# Patient Record
Sex: FEMALE | Race: BLACK OR AFRICAN AMERICAN | Employment: FULL TIME | URBAN - METROPOLITAN AREA
[De-identification: names, ages, dates, MRNs, and addresses within clinical notes are randomized per-mention and may not be internally consistent; named-entity substitution may affect disease eponyms.]

---

## 2023-02-23 LAB — SARS-COV-2 RESULT: DETECTED

## 2023-03-28 PROBLEM — M54.50 CHRONIC LOW BACK PAIN: Status: ACTIVE | Noted: 2023-03-28

## 2023-03-28 PROBLEM — T78.40XA ALLERGIES: Status: ACTIVE | Noted: 2023-03-28

## 2023-03-28 PROBLEM — G89.29 CHRONIC LOW BACK PAIN: Status: ACTIVE | Noted: 2023-03-28

## 2023-03-28 PROBLEM — M54.16 LUMBAR RADICULOPATHY, CHRONIC: Status: ACTIVE | Noted: 2023-03-28

## 2023-03-28 PROBLEM — M43.10 RETROLISTHESIS OF VERTEBRAE: Status: ACTIVE | Noted: 2023-03-28

## 2023-03-28 PROBLEM — E66.01 MORBID OBESITY (MULTI): Status: ACTIVE | Noted: 2023-03-28

## 2023-03-28 PROBLEM — M53.3 TAIL BONE PAIN: Status: ACTIVE | Noted: 2023-03-28

## 2023-03-28 PROBLEM — H93.11 TINNITUS OF RIGHT EAR: Status: ACTIVE | Noted: 2023-03-28

## 2023-03-28 RX ORDER — TRAMADOL HYDROCHLORIDE 50 MG/1
1 TABLET ORAL 3 TIMES DAILY PRN
COMMUNITY
Start: 2022-01-13 | End: 2024-06-06 | Stop reason: ALTCHOICE

## 2023-03-28 RX ORDER — DICLOFENAC SODIUM 75 MG/1
TABLET, DELAYED RELEASE ORAL
COMMUNITY
Start: 2022-07-19 | End: 2024-04-12

## 2023-05-04 PROBLEM — M62.838 MUSCLE SPASMS OF BOTH LOWER EXTREMITIES: Status: ACTIVE | Noted: 2023-05-04

## 2023-05-04 PROBLEM — M47.816 LUMBAR SPONDYLOSIS: Status: ACTIVE | Noted: 2023-05-04

## 2023-05-04 RX ORDER — CETIRIZINE HYDROCHLORIDE 10 MG/1
TABLET ORAL AS NEEDED
COMMUNITY
End: 2024-06-06 | Stop reason: SDUPTHER

## 2023-05-11 ENCOUNTER — TELEPHONE (OUTPATIENT)
Dept: PRIMARY CARE | Facility: CLINIC | Age: 48
End: 2023-05-11
Payer: COMMERCIAL

## 2023-05-12 DIAGNOSIS — Z12.31 BREAST CANCER SCREENING BY MAMMOGRAM: Primary | ICD-10-CM

## 2023-06-01 ENCOUNTER — OFFICE VISIT (OUTPATIENT)
Dept: PRIMARY CARE | Facility: CLINIC | Age: 48
End: 2023-06-01
Payer: COMMERCIAL

## 2023-06-01 VITALS
BODY MASS INDEX: 47.48 KG/M2 | SYSTOLIC BLOOD PRESSURE: 128 MMHG | DIASTOLIC BLOOD PRESSURE: 94 MMHG | WEIGHT: 258 LBS | HEART RATE: 85 BPM | TEMPERATURE: 97 F | HEIGHT: 62 IN | OXYGEN SATURATION: 96 %

## 2023-06-01 DIAGNOSIS — H61.23 IMPACTED CERUMEN OF BOTH EARS: ICD-10-CM

## 2023-06-01 DIAGNOSIS — M54.16 LUMBAR RADICULOPATHY, CHRONIC: ICD-10-CM

## 2023-06-01 DIAGNOSIS — T78.40XS ALLERGY, SEQUELA: ICD-10-CM

## 2023-06-01 DIAGNOSIS — E78.2 MIXED HYPERLIPIDEMIA: ICD-10-CM

## 2023-06-01 DIAGNOSIS — H93.11 TINNITUS OF RIGHT EAR: ICD-10-CM

## 2023-06-01 DIAGNOSIS — Z00.00 ANNUAL PHYSICAL EXAM: Primary | ICD-10-CM

## 2023-06-01 DIAGNOSIS — Z12.11 COLON CANCER SCREENING: ICD-10-CM

## 2023-06-01 DIAGNOSIS — R25.2 MUSCLE CRAMP, NOCTURNAL: ICD-10-CM

## 2023-06-01 PROBLEM — Z90.710 S/P TAH (TOTAL ABDOMINAL HYSTERECTOMY): Status: ACTIVE | Noted: 2019-08-08

## 2023-06-01 PROBLEM — N93.9 ABNORMAL UTERINE BLEEDING (AUB): Status: ACTIVE | Noted: 2019-06-04

## 2023-06-01 LAB
ERYTHROCYTE DISTRIBUTION WIDTH (RATIO) BY AUTOMATED COUNT: 12.9 % (ref 11.5–14.5)
ERYTHROCYTE MEAN CORPUSCULAR HEMOGLOBIN CONCENTRATION (G/DL) BY AUTOMATED: 31.8 G/DL (ref 32–36)
ERYTHROCYTE MEAN CORPUSCULAR VOLUME (FL) BY AUTOMATED COUNT: 98 FL (ref 80–100)
ERYTHROCYTES (10*6/UL) IN BLOOD BY AUTOMATED COUNT: 4.42 X10E12/L (ref 4–5.2)
ESTIMATED AVERAGE GLUCOSE FOR HBA1C: 105 MG/DL
HEMATOCRIT (%) IN BLOOD BY AUTOMATED COUNT: 43.1 % (ref 36–46)
HEMOGLOBIN (G/DL) IN BLOOD: 13.7 G/DL (ref 12–16)
HEMOGLOBIN A1C/HEMOGLOBIN TOTAL IN BLOOD: 5.3 %
LEUKOCYTES (10*3/UL) IN BLOOD BY AUTOMATED COUNT: 6.7 X10E9/L (ref 4.4–11.3)
NRBC (PER 100 WBCS) BY AUTOMATED COUNT: 0 /100 WBC (ref 0–0)
PLATELETS (10*3/UL) IN BLOOD AUTOMATED COUNT: 339 X10E9/L (ref 150–450)

## 2023-06-01 PROCEDURE — 80061 LIPID PANEL: CPT

## 2023-06-01 PROCEDURE — 83036 HEMOGLOBIN GLYCOSYLATED A1C: CPT

## 2023-06-01 PROCEDURE — 69210 REMOVE IMPACTED EAR WAX UNI: CPT | Performed by: NURSE PRACTITIONER

## 2023-06-01 PROCEDURE — 84443 ASSAY THYROID STIM HORMONE: CPT

## 2023-06-01 PROCEDURE — 82306 VITAMIN D 25 HYDROXY: CPT

## 2023-06-01 PROCEDURE — 80053 COMPREHEN METABOLIC PANEL: CPT

## 2023-06-01 PROCEDURE — 1036F TOBACCO NON-USER: CPT | Performed by: NURSE PRACTITIONER

## 2023-06-01 PROCEDURE — 85027 COMPLETE CBC AUTOMATED: CPT

## 2023-06-01 PROCEDURE — 99396 PREV VISIT EST AGE 40-64: CPT | Performed by: NURSE PRACTITIONER

## 2023-06-01 RX ORDER — FLUTICASONE PROPIONATE 50 MCG
1 SPRAY, SUSPENSION (ML) NASAL DAILY
Qty: 16 G | Refills: 11 | Status: SHIPPED | OUTPATIENT
Start: 2023-06-01 | End: 2024-06-06 | Stop reason: SDUPTHER

## 2023-06-01 NOTE — PROGRESS NOTES
"Subjective   Patient ID: Yajaira Obrien is a 47 y.o. female who presents for Annual Exam (Patient is fasting).    HPI  Pleasant established patient her for Annual   Current concerns include lower leg and ankle spasms, usually occur at night, relieved with massage. Spends most days on her feet at work, History of chronic back pain, Lumbar radiculopathy. Reports PT told her to increase her fluids which she has been doing.  Reviewed muscle fatigue and spasm, obtaining well fitting supportive shoes and daily stretching. Has James ap and tries to follow PT at home.    Pain Management awaiting MRI and plan pending results is to possibly proceed with injections at that time      Works as  Phlebotomist    Diet better, using air fryer, more vegetables  Caffeine no, sometimes tea  Water 64 oz+  Non-smoker  Alcohol Social    Eye exam 2020  Dental exam >5  Gyn no abnormal pap needs referral, Hx hyster  Colonoscopy ref  Mammogram May 2023, reviewed, WNL    Family history non contributory, sister SLE in remission    Review of Systems  Review of Systems   Constitutional: Negative.    HENT: Negative.     Respiratory: Negative.     Cardiovascular: Negative.    Gastrointestinal: Negative.    Genitourinary: Negative.    Musculoskeletal: HPI  Psychiatric/Behavioral: Negative.     All other systems reviewed and are negative.    .vsVisit Vitals  BP (!) 128/94 (BP Location: Left arm, Patient Position: Sitting)   Pulse 85   Temp 36.1 °C (97 °F)   Ht 1.575 m (5' 2\")   Wt 117 kg (258 lb)   SpO2 96%   BMI 47.19 kg/m²   Smoking Status Never   BSA 2.26 m²         Objective   Physical Exam  Physical Exam  Vitals reviewed.   Constitutional:       General: She is active.   HENT:      Head: Normocephalic and atraumatic.   Impacted cerumen bilateral  Eyes:      Extraocular Movements: Extraocular movements intact.      Pupils: Pupils are equal, round, and reactive to light.   Cardiovascular:      Rate and Rhythm: Normal rate and regular " rhythm.   Pulmonary:      Effort: Pulmonary effort is normal.      Breath sounds: Normal breath sounds.   Abdominal:      General: Bowel sounds are normal.   Musculoskeletal:         General: Normal range of motion.      Cervical back: Neck supple.   Skin:     General: Skin is warm and dry.      Capillary Refill: Capillary refill takes less than 2 seconds.   Neurological:      General: No focal deficit present.      Mental Status: She is alert.     Assessment/Plan   Problem List Items Addressed This Visit       Allergies    Relevant Medications    fluticasone (Flonase) 50 mcg/actuation nasal spray    Lumbar radiculopathy, chronic     Follows with Dr Diaz, completed PT   Awaiting MRI  Daily stretching         Tinnitus of right ear    Hyperlipidemia    Relevant Orders    Lipid Panel    Annual physical exam - Primary    Relevant Medications    fluticasone (Flonase) 50 mcg/actuation nasal spray    Other Relevant Orders    CBC    Comprehensive Metabolic Panel    Vitamin D, Total    TSH with reflex to Free T4 if abnormal    Hemoglobin A1C    Lipid Panel    Referral to Gastroenterology    Referral to Gynecology    Impacted cerumen of both ears    Relevant Orders    Ear cerumen removal    Colon cancer screening    Relevant Orders    Referral to Gastroenterology    Muscle cramp, nocturnal     Likely fatigue, begin daily Stretch  Supportive shoes

## 2023-06-02 DIAGNOSIS — E78.2 MIXED HYPERLIPIDEMIA: Primary | ICD-10-CM

## 2023-06-02 LAB
ALANINE AMINOTRANSFERASE (SGPT) (U/L) IN SER/PLAS: 8 U/L (ref 7–45)
ALBUMIN (G/DL) IN SER/PLAS: 3.8 G/DL (ref 3.4–5)
ALKALINE PHOSPHATASE (U/L) IN SER/PLAS: 66 U/L (ref 33–110)
ANION GAP IN SER/PLAS: 13 MMOL/L (ref 10–20)
ASPARTATE AMINOTRANSFERASE (SGOT) (U/L) IN SER/PLAS: 12 U/L (ref 9–39)
BILIRUBIN TOTAL (MG/DL) IN SER/PLAS: 0.4 MG/DL (ref 0–1.2)
CALCIDIOL (25 OH VITAMIN D3) (NG/ML) IN SER/PLAS: 20 NG/ML
CALCIUM (MG/DL) IN SER/PLAS: 9.1 MG/DL (ref 8.6–10.6)
CARBON DIOXIDE, TOTAL (MMOL/L) IN SER/PLAS: 26 MMOL/L (ref 21–32)
CHLORIDE (MMOL/L) IN SER/PLAS: 104 MMOL/L (ref 98–107)
CHOLESTEROL (MG/DL) IN SER/PLAS: 244 MG/DL (ref 0–199)
CHOLESTEROL IN HDL (MG/DL) IN SER/PLAS: 50.9 MG/DL
CHOLESTEROL/HDL RATIO: 4.8
CREATININE (MG/DL) IN SER/PLAS: 0.62 MG/DL (ref 0.5–1.05)
GFR FEMALE: >90 ML/MIN/1.73M2
GLUCOSE (MG/DL) IN SER/PLAS: 93 MG/DL (ref 74–99)
LDL: 178 MG/DL (ref 0–99)
POTASSIUM (MMOL/L) IN SER/PLAS: 4.2 MMOL/L (ref 3.5–5.3)
PROTEIN TOTAL: 6.9 G/DL (ref 6.4–8.2)
SODIUM (MMOL/L) IN SER/PLAS: 139 MMOL/L (ref 136–145)
THYROTROPIN (MIU/L) IN SER/PLAS BY DETECTION LIMIT <= 0.05 MIU/L: 1.49 MIU/L (ref 0.44–3.98)
TRIGLYCERIDE (MG/DL) IN SER/PLAS: 74 MG/DL (ref 0–149)
UREA NITROGEN (MG/DL) IN SER/PLAS: 11 MG/DL (ref 6–23)
VLDL: 15 MG/DL (ref 0–40)

## 2023-06-02 RX ORDER — ATORVASTATIN CALCIUM 10 MG/1
10 TABLET, FILM COATED ORAL DAILY
Qty: 30 TABLET | Refills: 5 | Status: SHIPPED | OUTPATIENT
Start: 2023-06-02 | End: 2023-12-19 | Stop reason: SDUPTHER

## 2023-08-04 ENCOUNTER — HOSPITAL ENCOUNTER (OUTPATIENT)
Dept: DATA CONVERSION | Facility: HOSPITAL | Age: 48
End: 2023-08-04
Attending: PHYSICAL MEDICINE & REHABILITATION | Admitting: PHYSICAL MEDICINE & REHABILITATION
Payer: COMMERCIAL

## 2023-08-04 DIAGNOSIS — M47.816 SPONDYLOSIS WITHOUT MYELOPATHY OR RADICULOPATHY, LUMBAR REGION: ICD-10-CM

## 2023-08-04 DIAGNOSIS — G89.29 OTHER CHRONIC PAIN: ICD-10-CM

## 2023-08-04 DIAGNOSIS — M48.061 SPINAL STENOSIS, LUMBAR REGION WITHOUT NEUROGENIC CLAUDICATION: ICD-10-CM

## 2023-08-04 DIAGNOSIS — M54.16 RADICULOPATHY, LUMBAR REGION: ICD-10-CM

## 2023-08-04 DIAGNOSIS — Z88.2 ALLERGY STATUS TO SULFONAMIDES: ICD-10-CM

## 2023-09-18 ENCOUNTER — LAB (OUTPATIENT)
Dept: LAB | Facility: LAB | Age: 48
End: 2023-09-18
Payer: COMMERCIAL

## 2023-09-18 LAB — TOBACCO SCREEN, URINE: NEGATIVE

## 2023-09-29 VITALS
RESPIRATION RATE: 16 BRPM | TEMPERATURE: 96.8 F | WEIGHT: 256.17 LBS | SYSTOLIC BLOOD PRESSURE: 140 MMHG | HEIGHT: 62 IN | HEART RATE: 82 BPM | BODY MASS INDEX: 47.14 KG/M2 | DIASTOLIC BLOOD PRESSURE: 90 MMHG

## 2023-09-30 NOTE — H&P
History of Present Illness:   Pregnant/Lactating:  ·  Are You Pregnant no (1)   ·  Are You Currently Breastfeeding no (1)     History Present Illness:  Reason for surgery: Chronic lower back and leg pain   HPI:    Chronic lower back and leg pain refractory to conservative treatment, achy and burning, worse with activity, better at rest, trying to avoid surgery, pain can be  a 5-7/10.    Allergies:        Allergies:  ·  Cipro : Itching, Hives/Urticaria  ·  sulfa  drugs: Itching, Hives/Urticaria    Home Medication Review:   Home Medications Reviewed: yes     Impression/Procedure:   ·  Impression and Planned Procedure: Lumbar stenosis and radiculitis  Plan for caudal COLLIN       ERAS (Enhanced Recovery After Surgery):  ·  ERAS Patient: no       Vital Signs:  Temperature C: 36 degrees C   Temperature F: 96.8 degrees F   Heart Rate: 82 beats per minute   Respiratory Rate: 16 breath per minute   Blood Pressure Systolic: 140 mm/Hg   Blood Pressure Diastolic: 90 mm/Hg     Physical Exam by System:    Constitutional: Well developed, awake/alert/oriented,  no distress, alert and cooperative   Eyes: EOMI, clear sclera   ENMT: mucous membranes moist, no apparent injury,  no lesions seen   Head/Neck: Neck supple, no apparent injury, trachea  midline   Respiratory/Thorax: Patent airways, non-labored breathing  with good chest expansion   Cardiovascular: no sig. edema   Extremities: normal extremities, no cyanosis edema,  contusions or wounds, no clubbing   Psychological: Appropriate mood and behavior   Skin: Warm and dry, no lesions, no rashes     Consent:   COVID-19 Consent:  ·  COVID-19 Risk Consent Surgeon has reviewed key risks related to the risk of tiffanie COVID-19 and if they contract COVID-19 what the risks are.       Electronic Signatures:  Chai Diaz)  (Signed 04-Aug-2023 11:37)   Authored: History of Present Illness, Allergies, Home  Medication Review, Impression/Procedure, ERAS, Physical Exam, Consent,  "Note Completion      Last Updated: 04-Aug-2023 11:37 by Chai Diaz)    References:  1.  Data Referenced From \"Patient Profile - Preop v3\" 03-Aug-2023 11:34   "

## 2023-10-01 NOTE — OP NOTE
Post Operative Note:     PreOp Diagnosis: Lumbar radiculitis and stenosis   Post-Procedure Diagnosis: same   Procedure: 1. Caudal COLLIN  2.   3.   4.   5.   Surgeon: ERI Diaz MD   Resident/Fellow/Other Assistant: NA   Estimated Blood Loss (mL): none   Specimen: no   Findings: NA     Operative Report Dictated:  Dictation: not applicable - note contains Operative  Report   Operative Report:    Fluoroscopic Caudal Epidural Steroid Injection    Indication: Lumbar/lumbosacral radiculitis and stenosis     After a discussion of the relative risks and benefits of the procedure, verbal and written informed consent was obtained.  The patient was taken to the fluoroscopy suite.    The patient was placed in the prone position on the fluoroscopic table and the midline along the intergluteal groove and area 10 cm by 5 cm laterally was prepped and draped in a sterile fashion.     DuraPrep was used per skin prep guidelines.  We waited 3 minutes by timer for the DuraPrep to dry before proceed with the procedure.    The superficial skin was anesthetized with 1% preservative free Lidocaine at the intended injection site just above the anus over the anococcygeal ligament.   Using sterile technique, a 3-1/2 inch 22G spinal needle was then directed through sacrococcygeal junction.      Negative aspiration was performed to rule out intravascular placement. Contrast was noted as a a small blob at the anterior border of the vertebral column.     After this, 20 mg of Dexamethasone + 10 cc of  1% xylocaine preservative free was injected after lack-of-resistance was established. The needle was subsequently removed.  Palpitation at the injection site during the injection failed to reveal any intradermal  extravasation of the injected fluid.     Pulse oximeter was used throughout the procedure and the patient's pulse and oxygen saturation remained within normal limits.  Vital signs remained normal.  There were no complications.     The  patient was instructed to apply ice over the injection site for twenty minutes every two hours for the next twenty-four to forty-eight hours.  The patient was also instructed to contact me if there is any exacerbation of the symptoms. Post procedure  instruction sheet was given.  The patient was recommended to follow up with me in one to two weeks.      Attestation:   Note Completion:  Attending Attestation I performed the procedure without a resident         Electronic Signatures:  Chai Diaz)  (Signed 04-Aug-2023 12:30)   Authored: Post Operative Note, Note Completion      Last Updated: 04-Aug-2023 12:30 by Chai Diaz)

## 2023-12-19 DIAGNOSIS — E78.2 MIXED HYPERLIPIDEMIA: ICD-10-CM

## 2023-12-20 RX ORDER — ATORVASTATIN CALCIUM 10 MG/1
10 TABLET, FILM COATED ORAL DAILY
Qty: 30 TABLET | Refills: 5 | Status: SHIPPED | OUTPATIENT
Start: 2023-12-20 | End: 2024-06-06 | Stop reason: SDUPTHER

## 2024-02-08 ENCOUNTER — LAB (OUTPATIENT)
Dept: LAB | Facility: LAB | Age: 49
End: 2024-02-08
Payer: COMMERCIAL

## 2024-03-04 ENCOUNTER — TELEPHONE (OUTPATIENT)
Dept: PRIMARY CARE | Facility: CLINIC | Age: 49
End: 2024-03-04
Payer: COMMERCIAL

## 2024-03-04 DIAGNOSIS — Z12.11 COLON CANCER SCREENING: Primary | ICD-10-CM

## 2024-04-12 ENCOUNTER — OFFICE VISIT (OUTPATIENT)
Dept: GASTROENTEROLOGY | Facility: CLINIC | Age: 49
End: 2024-04-12
Payer: COMMERCIAL

## 2024-04-12 VITALS
RESPIRATION RATE: 18 BRPM | DIASTOLIC BLOOD PRESSURE: 89 MMHG | BODY MASS INDEX: 48.07 KG/M2 | HEART RATE: 94 BPM | SYSTOLIC BLOOD PRESSURE: 137 MMHG | WEIGHT: 262.57 LBS | TEMPERATURE: 97.3 F

## 2024-04-12 DIAGNOSIS — Z12.11 ENCOUNTER FOR SCREENING COLONOSCOPY: Primary | ICD-10-CM

## 2024-04-12 DIAGNOSIS — Z12.11 COLON CANCER SCREENING: ICD-10-CM

## 2024-04-12 PROCEDURE — 99204 OFFICE O/P NEW MOD 45 MIN: CPT | Performed by: NURSE PRACTITIONER

## 2024-04-12 RX ORDER — POLYETHYLENE GLYCOL 3350, SODIUM CHLORIDE, SODIUM BICARBONATE, POTASSIUM CHLORIDE 420; 11.2; 5.72; 1.48 G/4L; G/4L; G/4L; G/4L
4000 POWDER, FOR SOLUTION ORAL ONCE
Qty: 4000 ML | Refills: 0 | Status: SHIPPED | OUTPATIENT
Start: 2024-04-12 | End: 2024-04-12

## 2024-04-12 RX ORDER — BISMUTH SUBSALICYLATE 262 MG
1 TABLET,CHEWABLE ORAL DAILY
COMMUNITY

## 2024-04-12 ASSESSMENT — ENCOUNTER SYMPTOMS
LIGHT-HEADEDNESS: 0
DIAPHORESIS: 0
WEAKNESS: 0
CHILLS: 0
FATIGUE: 0
HALLUCINATIONS: 0
NERVOUS/ANXIOUS: 0
HEMATURIA: 0
MYALGIAS: 0
PHOTOPHOBIA: 0
COUGH: 0
EYE PAIN: 0
BACK PAIN: 0
AGITATION: 0
DIZZINESS: 0
FREQUENCY: 0
PALPITATIONS: 0
WHEEZING: 0
HEADACHES: 0
DYSURIA: 0
ADENOPATHY: 0
SHORTNESS OF BREATH: 0
FEVER: 0
FLANK PAIN: 0
NUMBNESS: 0
JOINT SWELLING: 0
SORE THROAT: 0
ARTHRALGIAS: 0

## 2024-04-12 NOTE — PATIENT INSTRUCTIONS
Thanks for coming to the GI clinic.     You are due for a screening colonoscopy. Please call [phone] to schedule.,   Please read all of the instructions 7 days before your colonoscopy.   You will need to take ONLY clear liquids the ENTIRE day before your procedure. These include (clear fruit juices, soda, Gatorade, broth, jello and coffee/tea) Avoid red and purple drinks. No cream or milk in the coffee.   You will need to take a bowel preparation.   You will also need a .      I recommend use of a daily fiber supplement such as psyllium husk (Metamucil).     I recommend drinking 6-8 glasses of water per day to help with constipation.     Follow up 3 weeks after completion of the colonoscopy.

## 2024-04-12 NOTE — PROGRESS NOTES
Subjective   Patient ID: Yajaira Obrien is a 48 y.o. female who presents for  colonoscopy consult.     This is a 48 year old AAF with history of morbid obesity, HLD, and chronic back pain who is presenting to the GI clinic for an initial visit.     History per pt and review of EMR    Pt is here to discuss getting a screening colonoscopy done. She never had a colonoscopy.     Reports for the past 8 years she's been having constipation. She moves her bowels daily, but feels a sense of incomplete bowel evacuation. Sometimes stools are small pellets. Some relief with Mag07. She's been trying to eat more vegetables. She drinks a lot of water.     She took Metamucil in the past.     ROS positive for 4 months' worth of sharp abdominal pain located to the left of her umbilicus which improves with defecation. Sometimes the pain goes away on its own as well. The pain last 30-60 minutes.     Denies unintentional weight loss, heartburn, dysphagia, odynophagia, diarrhea, hematemesis, hematochezia, and melena.           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   HENT:  Negative for congestion, ear pain, hearing loss, sneezing and sore throat.    Eyes:  Negative for photophobia, pain and visual disturbance.   Respiratory:  Negative for cough, shortness of breath and wheezing.    Cardiovascular:  Negative for chest pain, palpitations and leg swelling.   Endocrine: Negative for cold intolerance and heat intolerance.   Genitourinary:  Negative  for dysuria, flank pain, frequency and hematuria.   Musculoskeletal:  Negative for arthralgias, back pain, gait problem, joint swelling and myalgias.   Skin:  Negative for rash.   Neurological:  Negative for dizziness, syncope, weakness, light-headedness, numbness and headaches.   Hematological:  Negative for adenopathy.   Psychiatric/Behavioral:  Negative for agitation and hallucinations. The patient is not nervous/anxious.        Allergies   Allergen Reactions    Azithromycin Unknown    Ciprofloxacin Unknown    Sulfa (Sulfonamide Antibiotics) Unknown     Current Outpatient Medications   Medication Sig Dispense Refill    cetirizine (ZyrTEC) 10 mg tablet Take by mouth if needed.      atorvastatin (Lipitor) 10 mg tablet Take 1 tablet (10 mg) by mouth once daily. 30 tablet 5    fluticasone (Flonase) 50 mcg/actuation nasal spray Administer 1 spray into each nostril once daily. Shake gently. Before first use, prime pump. After use, clean tip and replace cap. 16 g 11    multivitamin tablet Take 1 tablet by mouth once daily. Gummy multivitamin      polyethylene glycol-electrolytes 420 gram solution Take 4,000 mL by mouth 1 time for 1 dose. Use as directed by  endoscopy department for colonoscopy 4000 mL 0    traMADol (Ultram) 50 mg tablet Take 1 tablet (50 mg) by mouth 3 times a day as needed.      UNABLE TO FIND OTC probiotic       No current facility-administered medications for this visit.        Objective     /89   Pulse 94   Temp 36.3 °C (97.3 °F) (Temporal)   Resp 18   Wt 119 kg (262 lb 9.1 oz)   BMI 48.07 kg/m²     Physical Exam  Constitutional:       General: She is not in acute distress.     Appearance: Normal appearance. She is morbidly obese.   HENT:      Head: Normocephalic and atraumatic.   Eyes:      Conjunctiva/sclera: Conjunctivae normal.   Cardiovascular:      Rate and Rhythm: Normal rate and regular rhythm.      Heart sounds: No murmur heard.     No gallop.   Pulmonary:      Effort:  Pulmonary effort is normal.      Breath sounds: Normal breath sounds.   Abdominal:      General: Bowel sounds are normal. There is no distension.      Tenderness: There is no abdominal tenderness. There is no guarding.   Musculoskeletal:         General: No swelling or deformity. Normal range of motion.      Cervical back: Normal range of motion. No rigidity.   Skin:     General: Skin is warm and dry.      Coloration: Skin is not jaundiced.      Findings: No lesion or rash.   Neurological:      General: No focal deficit present.      Mental Status: She is oriented to person, place, and time.   Psychiatric:         Mood and Affect: Mood normal.         Assessment/Plan   Problem List Items Addressed This Visit       Colon cancer screening     Other Visit Diagnoses       Encounter for screening colonoscopy    -  Primary    Relevant Medications    polyethylene glycol-electrolytes 420 gram solution    Other Relevant Orders    Colonoscopy Screening; Average Risk Patient           Colorectal cancer screening:  - will proceed with screening colonoscopy   - Golytely split bowel prep    2. Chronic constipation:  - recommend a daily fiber supplement such as psyllium husk  - recommend drinking 6-8 glasses of water per day    3. Chronic periumbilical abdominal pain: sounds functional, perhaps some IBS component  - constipation management as above    4. Follow up:  - return to clinic 3 weeks after the completion of colonoscopy

## 2024-06-06 ENCOUNTER — OFFICE VISIT (OUTPATIENT)
Dept: PRIMARY CARE | Facility: CLINIC | Age: 49
End: 2024-06-06
Payer: COMMERCIAL

## 2024-06-06 VITALS
WEIGHT: 264.2 LBS | SYSTOLIC BLOOD PRESSURE: 124 MMHG | TEMPERATURE: 96.6 F | OXYGEN SATURATION: 97 % | HEIGHT: 62 IN | DIASTOLIC BLOOD PRESSURE: 80 MMHG | BODY MASS INDEX: 48.62 KG/M2 | HEART RATE: 81 BPM

## 2024-06-06 DIAGNOSIS — Z00.00 ANNUAL PHYSICAL EXAM: Primary | ICD-10-CM

## 2024-06-06 DIAGNOSIS — T78.40XS ALLERGY, SEQUELA: ICD-10-CM

## 2024-06-06 DIAGNOSIS — M62.838 MUSCLE SPASMS OF BOTH LOWER EXTREMITIES: ICD-10-CM

## 2024-06-06 DIAGNOSIS — M54.41 CHRONIC RIGHT-SIDED LOW BACK PAIN WITH RIGHT-SIDED SCIATICA: ICD-10-CM

## 2024-06-06 DIAGNOSIS — R30.0 DYSURIA: ICD-10-CM

## 2024-06-06 DIAGNOSIS — J45.20 MILD INTERMITTENT ASTHMA WITHOUT COMPLICATION (HHS-HCC): ICD-10-CM

## 2024-06-06 DIAGNOSIS — G89.29 CHRONIC RIGHT-SIDED LOW BACK PAIN WITH RIGHT-SIDED SCIATICA: ICD-10-CM

## 2024-06-06 DIAGNOSIS — M54.16 LUMBAR RADICULOPATHY, CHRONIC: ICD-10-CM

## 2024-06-06 DIAGNOSIS — E78.2 MIXED HYPERLIPIDEMIA: ICD-10-CM

## 2024-06-06 DIAGNOSIS — E66.01 MORBID OBESITY (MULTI): ICD-10-CM

## 2024-06-06 DIAGNOSIS — Z12.31 BREAST CANCER SCREENING BY MAMMOGRAM: ICD-10-CM

## 2024-06-06 LAB
25(OH)D3 SERPL-MCNC: 51 NG/ML (ref 30–100)
ALBUMIN SERPL BCP-MCNC: 3.9 G/DL (ref 3.4–5)
ALP SERPL-CCNC: 90 U/L (ref 33–110)
ALT SERPL W P-5'-P-CCNC: 10 U/L (ref 7–45)
ANION GAP SERPL CALC-SCNC: 12 MMOL/L (ref 10–20)
AST SERPL W P-5'-P-CCNC: 13 U/L (ref 9–39)
BILIRUB SERPL-MCNC: 0.3 MG/DL (ref 0–1.2)
BUN SERPL-MCNC: 12 MG/DL (ref 6–23)
CALCIUM SERPL-MCNC: 8.9 MG/DL (ref 8.6–10.6)
CHLORIDE SERPL-SCNC: 104 MMOL/L (ref 98–107)
CHOLEST SERPL-MCNC: 179 MG/DL (ref 0–199)
CHOLESTEROL/HDL RATIO: 4
CO2 SERPL-SCNC: 28 MMOL/L (ref 21–32)
CREAT SERPL-MCNC: 0.67 MG/DL (ref 0.5–1.05)
EGFRCR SERPLBLD CKD-EPI 2021: >90 ML/MIN/1.73M*2
ERYTHROCYTE [DISTWIDTH] IN BLOOD BY AUTOMATED COUNT: 12.4 % (ref 11.5–14.5)
EST. AVERAGE GLUCOSE BLD GHB EST-MCNC: 111 MG/DL
GLUCOSE SERPL-MCNC: 90 MG/DL (ref 74–99)
HBA1C MFR BLD: 5.5 %
HCT VFR BLD AUTO: 38.7 % (ref 36–46)
HDLC SERPL-MCNC: 44.6 MG/DL
HGB BLD-MCNC: 12.9 G/DL (ref 12–16)
LDLC SERPL CALC-MCNC: 117 MG/DL
MCH RBC QN AUTO: 31.2 PG (ref 26–34)
MCHC RBC AUTO-ENTMCNC: 33.3 G/DL (ref 32–36)
MCV RBC AUTO: 94 FL (ref 80–100)
NON HDL CHOLESTEROL: 134 MG/DL (ref 0–149)
NRBC BLD-RTO: 0 /100 WBCS (ref 0–0)
PLATELET # BLD AUTO: 313 X10*3/UL (ref 150–450)
POC APPEARANCE, URINE: CLEAR
POC BILIRUBIN, URINE: NEGATIVE
POC BLOOD, URINE: NEGATIVE
POC COLOR, URINE: YELLOW
POC GLUCOSE, URINE: NEGATIVE MG/DL
POC KETONES, URINE: NEGATIVE MG/DL
POC LEUKOCYTES, URINE: NEGATIVE
POC NITRITE,URINE: NEGATIVE
POC PH, URINE: 7 PH
POC PROTEIN, URINE: NEGATIVE MG/DL
POC SPECIFIC GRAVITY, URINE: 1.02
POC UROBILINOGEN, URINE: 1 EU/DL
POTASSIUM SERPL-SCNC: 4.2 MMOL/L (ref 3.5–5.3)
PROT SERPL-MCNC: 6.7 G/DL (ref 6.4–8.2)
RBC # BLD AUTO: 4.13 X10*6/UL (ref 4–5.2)
SODIUM SERPL-SCNC: 140 MMOL/L (ref 136–145)
TRIGL SERPL-MCNC: 88 MG/DL (ref 0–149)
TSH SERPL-ACNC: 1.13 MIU/L (ref 0.44–3.98)
VLDL: 18 MG/DL (ref 0–40)
WBC # BLD AUTO: 7.4 X10*3/UL (ref 4.4–11.3)

## 2024-06-06 PROCEDURE — 85027 COMPLETE CBC AUTOMATED: CPT

## 2024-06-06 PROCEDURE — 82306 VITAMIN D 25 HYDROXY: CPT

## 2024-06-06 PROCEDURE — 80061 LIPID PANEL: CPT

## 2024-06-06 PROCEDURE — 84443 ASSAY THYROID STIM HORMONE: CPT

## 2024-06-06 PROCEDURE — 36415 COLL VENOUS BLD VENIPUNCTURE: CPT

## 2024-06-06 PROCEDURE — 1036F TOBACCO NON-USER: CPT | Performed by: NURSE PRACTITIONER

## 2024-06-06 PROCEDURE — 81002 URINALYSIS NONAUTO W/O SCOPE: CPT | Performed by: NURSE PRACTITIONER

## 2024-06-06 PROCEDURE — 80053 COMPREHEN METABOLIC PANEL: CPT

## 2024-06-06 PROCEDURE — 83036 HEMOGLOBIN GLYCOSYLATED A1C: CPT

## 2024-06-06 PROCEDURE — 99396 PREV VISIT EST AGE 40-64: CPT | Performed by: NURSE PRACTITIONER

## 2024-06-06 RX ORDER — ATORVASTATIN CALCIUM 10 MG/1
10 TABLET, FILM COATED ORAL DAILY
Qty: 90 EACH | Refills: 1 | Status: SHIPPED | OUTPATIENT
Start: 2024-06-06 | End: 2024-12-03

## 2024-06-06 RX ORDER — FLUTICASONE PROPIONATE 50 MCG
1 SPRAY, SUSPENSION (ML) NASAL DAILY
Qty: 16 G | Refills: 11 | Status: SHIPPED | OUTPATIENT
Start: 2024-06-06 | End: 2025-06-06

## 2024-06-06 RX ORDER — CETIRIZINE HYDROCHLORIDE 10 MG/1
10 TABLET ORAL AS NEEDED
Qty: 90 TABLET | Refills: 3 | Status: SHIPPED | OUTPATIENT
Start: 2024-06-06 | End: 2025-06-06

## 2024-06-06 ASSESSMENT — PROMIS GLOBAL HEALTH SCALE
CARRYOUT_PHYSICAL_ACTIVITIES: MODERATELY
RATE_PHYSICAL_HEALTH: FAIR
EMOTIONAL_PROBLEMS: SOMETIMES
RATE_AVERAGE_PAIN: 7
RATE_AVERAGE_FATIGUE: MODERATE
RATE_QUALITY_OF_LIFE: GOOD
RATE_SOCIAL_SATISFACTION: GOOD
RATE_MENTAL_HEALTH: FAIR
CARRYOUT_SOCIAL_ACTIVITIES: FAIR
RATE_GENERAL_HEALTH: GOOD

## 2024-06-06 ASSESSMENT — PAIN SCALES - GENERAL: PAINLEVEL: 0-NO PAIN

## 2024-06-06 ASSESSMENT — PATIENT HEALTH QUESTIONNAIRE - PHQ9
1. LITTLE INTEREST OR PLEASURE IN DOING THINGS: NOT AT ALL
2. FEELING DOWN, DEPRESSED OR HOPELESS: NOT AT ALL
SUM OF ALL RESPONSES TO PHQ9 QUESTIONS 1 AND 2: 0

## 2024-06-06 ASSESSMENT — ENCOUNTER SYMPTOMS: RESPIRATORY NEGATIVE: 1

## 2024-06-06 NOTE — PATIENT INSTRUCTIONS
Labs will be released to My Chart or if you are not connected you will be notified of abnormals only    Start stretching after work    Eliminate sugar form your diet    Health Maintenance  Continue to follow a healthy diet with fruits and vegetables at most meals.  Daily exercise is recommended as well as adding weights 3 times a week to maintain muscle mass and for bone health.  It is recommended you drink 6 to 8 glasses of water daily, more when you are exerting yourself or in hot weather.  Make sure you follow the health screening guidelines and keep up to date on your immunizations!    Constipation  Though occasional constipation is very common, some people experience chronic constipation that can interfere with their ability to go about their daily tasks.   Chronic constipation may also cause people to strain excessively in order to have a bowel movement.    Symptoms    Signs and symptoms of chronic constipation include:  Passing fewer than three stools a week   Having lumpy or hard stools   Straining to have bowel movements   Feeling as though there's a blockage in your rectum that prevents bowel movements   Feeling as though you can't completely empty the stool from your rectum   Needing help to empty your rectum, such as using your hands to press on your abdomen and using a finger to remove stool from your rectum  Constipation may be considered chronic if you've experienced two or more of these symptoms for the last three months.    Risk factors    Being an older adult   Being a woman   Being dehydrated   Eating a diet that's low in fiber   Getting little or no physical activity   Taking certain medications, including sedatives, opioid pain medications, some antidepressants or medications to lower blood pressure   Having a mental health condition such as depression or an eating disorder    Prevention    Include plenty of high-fiber foods in your diet, including beans, vegetables, fruits, whole grain cereals  and bran.   Eat fewer foods with low amounts of fiber such as processed foods, and dairy and meat products.   Drink plenty of fluids.   Stay as active as possible and try to get regular exercise.   Try to manage stress.   Don't ignore the urge to pass stool.   Try to create a regular schedule for bowel movements, especially after a meal.   Make sure children who begin to eat solid foods get plenty of fiber in their diets.

## 2024-06-06 NOTE — ASSESSMENT & PLAN NOTE
Follows with Chai Diaz MD.   EMG/nerve conduction study.  Continue to stretch morning and night.

## 2024-06-06 NOTE — PROGRESS NOTES
Subjective   Patient ID: Yajaira Obrien is a 48 y.o. female who presents for Annual Exam (Pt is fasting ), Weight Loss (Discuss weight loss medication ), Back Pain, and Leg Cramps.    HPI   Pleasant established 49 y/o female with a PMH of HLD, tinnitus of right ear, morbid obesity, chronic back pain, Lumbar spondylosis, Lumbar stenosis with neurogenic claudication and lumbar radiculopathy who presents for Annual Exam.    Current Concerns:  Chronic Back Pain, Lumbar radiculopathy  - Bilateral lower back and gluteal pain, Intermittent radicular symptoms down both legs. Denies any loss of bladder or bowel function. Can be intermittently severe and impact her daily activities, has noticed more frequent lower leg muscle spasms on days she works. Recently dropped down to part time at work to help manage symptoms. Yoga daily, Follows with pain management, Dr Diaz. Received Caudal epidural steroid injection in the past, denied any significant relief. Recently was recommended EMG/nerve conduction study which we discussed today.     Morbid Obesity - Interested in weight loss medication. Has been evaluated by Nutritionist. Diet needs improvement, Tries not to eat at work, discussed 3-5 small meals, no skipping. She is not exercising formally 2/2 chronic pain, does do yoga daily at home. Will investigate insurance coverage.     Constipation, chronic - Managed well with mushroom coffee and mag supplement.     Tingling with Urination - Noted recently, denies dysuria, new flank pain, frequency or hesitancy, no hematuria, taking otc supplement with good results, drinking more water. Obtain UA.       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 Caffeine no  Water 64 oz+  Exercise, yoga 5 days/week, stretches daily  Non-smoker  Alcohol Social      Eye exam 2024  Dental exam >5  Gyn hx hyster  Colonoscopy scheduled in Aug 24'  Mammogram May 2023, referral     Family  "history non contributory, sister SLE in remission    Review of Systems   HENT: Negative.     Respiratory: Negative.     Cardiovascular:         R/t anxiety   Genitourinary:         UA ordered   All other systems reviewed and are negative.        Objective   /80 (BP Location: Left arm, Patient Position: Sitting)   Pulse 81   Temp 35.9 °C (96.6 °F)   Ht 1.575 m (5' 2\")   Wt 120 kg (264 lb 3.2 oz)   SpO2 97%   BMI 48.32 kg/m²     Physical Exam  Vitals reviewed.   Constitutional:       Appearance: Normal appearance. She is well-developed. She is obese.   HENT:      Head: Normocephalic and atraumatic.      Right Ear: Tympanic membrane and ear canal normal.      Left Ear: Tympanic membrane and ear canal normal.      Nose: Nose normal.      Mouth/Throat:      Mouth: Mucous membranes are moist.      Pharynx: Oropharynx is clear.   Eyes:      Extraocular Movements: Extraocular movements intact.      Pupils: Pupils are equal, round, and reactive to light.   Neck:      Thyroid: No thyroid mass.   Cardiovascular:      Rate and Rhythm: Regular rhythm.      Pulses: Normal pulses.      Heart sounds: Normal heart sounds.   Pulmonary:      Effort: Pulmonary effort is normal.      Breath sounds: Decreased breath sounds present.      Comments: All lobes.  Abdominal:      General: Bowel sounds are normal.      Palpations: Abdomen is soft.      Tenderness: There is no abdominal tenderness.   Musculoskeletal:         General: Normal range of motion.      Cervical back: Normal range of motion and neck supple.   Lymphadenopathy:      Cervical: No cervical adenopathy.   Skin:     General: Skin is warm.      Capillary Refill: Capillary refill takes 2 to 3 seconds.   Neurological:      General: No focal deficit present.      Mental Status: She is alert.   Psychiatric:         Mood and Affect: Mood normal.         Assessment/Plan   Problem List Items Addressed This Visit             ICD-10-CM    Allergies T78.40XA    Relevant " Medications    fluticasone (Flonase) 50 mcg/actuation nasal spray    cetirizine (ZyrTEC) 10 mg tablet    Chronic low back pain M54.50, G89.29     Follows with Chai Diaz MD.   EMG/nerve conduction study.  Continue to stretch morning and night.           Lumbar radiculopathy, chronic M54.16    Relevant Orders    Vitamin D 25-Hydroxy,Total (for eval of Vitamin D levels)    Morbid obesity (Multi) E66.01     Encouraged healthy lifestyle.         Relevant Orders    CBC    Comprehensive Metabolic Panel    Hemoglobin A1C    Lipid Panel    Muscle spasms of both lower extremities M62.838     Continue yoga, stretch after work  Follows with pain management         Hyperlipidemia E78.5    Relevant Medications    atorvastatin (Lipitor) 10 mg tablet    Other Relevant Orders    Lipid Panel    Mild intermittent asthma (HHS-HCC) J45.20     Stable.         Annual physical exam - Primary Z00.00    Relevant Medications    fluticasone (Flonase) 50 mcg/actuation nasal spray    Other Relevant Orders    CBC    Comprehensive Metabolic Panel    Vitamin D 25-Hydroxy,Total (for eval of Vitamin D levels)    TSH with reflex to Free T4 if abnormal    Hemoglobin A1C    Lipid Panel    POCT UA (nonautomated) manually resulted (Completed)     Other Visit Diagnoses         Codes    Breast cancer screening by mammogram     Z12.31    Relevant Orders    BI mammo bilateral screening tomosynthesis    Dysuria     R30.0    Relevant Orders    POCT UA (nonautomated) manually resulted (Completed)

## 2024-06-06 NOTE — PROGRESS NOTES
I was present with the APRN student who participated in the documentation of this note. I have personally seen and re-examined the patient and performed the medical decision-making components (assessment and plan of care). I have reviewed the APRN student documentation and verified the findings in the note as written with additions or exceptions as stated in the body of this note  Fabienne Rosa [facility]

## 2024-06-17 ENCOUNTER — HOSPITAL ENCOUNTER (OUTPATIENT)
Dept: RADIOLOGY | Facility: CLINIC | Age: 49
Discharge: HOME | End: 2024-06-17
Payer: COMMERCIAL

## 2024-06-17 VITALS — HEIGHT: 62 IN | WEIGHT: 264 LBS | BODY MASS INDEX: 48.58 KG/M2

## 2024-06-17 PROCEDURE — 77063 BREAST TOMOSYNTHESIS BI: CPT | Performed by: STUDENT IN AN ORGANIZED HEALTH CARE EDUCATION/TRAINING PROGRAM

## 2024-06-17 PROCEDURE — 77067 SCR MAMMO BI INCL CAD: CPT

## 2024-06-17 PROCEDURE — 77067 SCR MAMMO BI INCL CAD: CPT | Performed by: STUDENT IN AN ORGANIZED HEALTH CARE EDUCATION/TRAINING PROGRAM

## 2024-07-15 DIAGNOSIS — E78.2 MIXED HYPERLIPIDEMIA: ICD-10-CM

## 2024-07-16 RX ORDER — ATORVASTATIN CALCIUM 10 MG/1
10 TABLET, FILM COATED ORAL DAILY
Qty: 90 TABLET | Refills: 2 | Status: SHIPPED | OUTPATIENT
Start: 2024-07-16 | End: 2025-01-12

## 2024-08-27 ENCOUNTER — ANESTHESIA EVENT (OUTPATIENT)
Dept: GASTROENTEROLOGY | Facility: HOSPITAL | Age: 49
End: 2024-08-27
Payer: COMMERCIAL

## 2024-08-27 ENCOUNTER — HOSPITAL ENCOUNTER (OUTPATIENT)
Dept: GASTROENTEROLOGY | Facility: HOSPITAL | Age: 49
Setting detail: OUTPATIENT SURGERY
Discharge: HOME | End: 2024-08-27
Payer: COMMERCIAL

## 2024-08-27 ENCOUNTER — ANESTHESIA (OUTPATIENT)
Dept: GASTROENTEROLOGY | Facility: HOSPITAL | Age: 49
End: 2024-08-27
Payer: COMMERCIAL

## 2024-08-27 VITALS
RESPIRATION RATE: 16 BRPM | DIASTOLIC BLOOD PRESSURE: 82 MMHG | SYSTOLIC BLOOD PRESSURE: 139 MMHG | BODY MASS INDEX: 47.67 KG/M2 | HEIGHT: 62 IN | TEMPERATURE: 97.2 F | HEART RATE: 85 BPM | WEIGHT: 259.04 LBS | OXYGEN SATURATION: 100 %

## 2024-08-27 DIAGNOSIS — Z12.11 ENCOUNTER FOR SCREENING COLONOSCOPY: ICD-10-CM

## 2024-08-27 PROBLEM — G47.33 OSA (OBSTRUCTIVE SLEEP APNEA): Status: ACTIVE | Noted: 2024-08-27

## 2024-08-27 PROCEDURE — 3700000002 HC GENERAL ANESTHESIA TIME - EACH INCREMENTAL 1 MINUTE

## 2024-08-27 PROCEDURE — 7100000010 HC PHASE TWO TIME - EACH INCREMENTAL 1 MINUTE

## 2024-08-27 PROCEDURE — 3700000001 HC GENERAL ANESTHESIA TIME - INITIAL BASE CHARGE

## 2024-08-27 PROCEDURE — 7100000009 HC PHASE TWO TIME - INITIAL BASE CHARGE

## 2024-08-27 PROCEDURE — 2500000004 HC RX 250 GENERAL PHARMACY W/ HCPCS (ALT 636 FOR OP/ED): Performed by: NURSE ANESTHETIST, CERTIFIED REGISTERED

## 2024-08-27 PROCEDURE — 45385 COLONOSCOPY W/LESION REMOVAL: CPT | Performed by: STUDENT IN AN ORGANIZED HEALTH CARE EDUCATION/TRAINING PROGRAM

## 2024-08-27 PROCEDURE — 2500000004 HC RX 250 GENERAL PHARMACY W/ HCPCS (ALT 636 FOR OP/ED): Performed by: STUDENT IN AN ORGANIZED HEALTH CARE EDUCATION/TRAINING PROGRAM

## 2024-08-27 RX ORDER — SODIUM CHLORIDE, SODIUM LACTATE, POTASSIUM CHLORIDE, CALCIUM CHLORIDE 600; 310; 30; 20 MG/100ML; MG/100ML; MG/100ML; MG/100ML
20 INJECTION, SOLUTION INTRAVENOUS CONTINUOUS
Status: DISCONTINUED | OUTPATIENT
Start: 2024-08-27 | End: 2024-08-28 | Stop reason: HOSPADM

## 2024-08-27 RX ORDER — PROPOFOL 10 MG/ML
INJECTION, EMULSION INTRAVENOUS CONTINUOUS PRN
Status: DISCONTINUED | OUTPATIENT
Start: 2024-08-27 | End: 2024-08-27

## 2024-08-27 ASSESSMENT — ENCOUNTER SYMPTOMS
WHEEZING: 0
JOINT SWELLING: 0
UNEXPECTED WEIGHT CHANGE: 0
ABDOMINAL DISTENTION: 0
DIFFICULTY URINATING: 0
COLOR CHANGE: 0
VOMITING: 0
CHILLS: 0
TROUBLE SWALLOWING: 0
NAUSEA: 0
SLEEP DISTURBANCE: 0
DIZZINESS: 0
ABDOMINAL PAIN: 0
ARTHRALGIAS: 0
SHORTNESS OF BREATH: 0
HEADACHES: 0
CONFUSION: 0
FEVER: 0
DIARRHEA: 0
SPEECH DIFFICULTY: 0
COUGH: 0
CONSTIPATION: 0
LIGHT-HEADEDNESS: 0

## 2024-08-27 ASSESSMENT — PAIN SCALES - GENERAL
PAINLEVEL_OUTOF10: 0 - NO PAIN
PAIN_LEVEL: 0
PAINLEVEL_OUTOF10: 0 - NO PAIN

## 2024-08-27 ASSESSMENT — PAIN - FUNCTIONAL ASSESSMENT
PAIN_FUNCTIONAL_ASSESSMENT: 0-10

## 2024-08-27 ASSESSMENT — COLUMBIA-SUICIDE SEVERITY RATING SCALE - C-SSRS
6. HAVE YOU EVER DONE ANYTHING, STARTED TO DO ANYTHING, OR PREPARED TO DO ANYTHING TO END YOUR LIFE?: NO
1. IN THE PAST MONTH, HAVE YOU WISHED YOU WERE DEAD OR WISHED YOU COULD GO TO SLEEP AND NOT WAKE UP?: NO
2. HAVE YOU ACTUALLY HAD ANY THOUGHTS OF KILLING YOURSELF?: NO

## 2024-08-27 NOTE — H&P
History Of Present Illness  Yajaira Obrien is a 48 y.o. female presenting with screening colonoscopy.      Past Medical History  Past Medical History:   Diagnosis Date    Allergies     Hyperlipidemia      Surgical History  Past Surgical History:   Procedure Laterality Date    HYSTERECTOMY       Social History  She reports that she has never smoked. She has never used smokeless tobacco. She reports current alcohol use. She reports that she does not currently use drugs.    Family History  No family history on file.     Allergies  Allergies   Allergen Reactions    Azithromycin Unknown    Ciprofloxacin Unknown    Sulfa (Sulfonamide Antibiotics) Unknown     Review of Systems   Constitutional:  Negative for chills, fever and unexpected weight change.   HENT:  Negative for congestion and trouble swallowing.    Respiratory:  Negative for cough, shortness of breath and wheezing.    Cardiovascular:  Negative for chest pain.   Gastrointestinal:  Negative for abdominal distention, abdominal pain, constipation, diarrhea, nausea and vomiting.   Genitourinary:  Negative for difficulty urinating.   Musculoskeletal:  Negative for arthralgias and joint swelling.   Skin:  Negative for color change.   Neurological:  Negative for dizziness, speech difficulty, light-headedness and headaches.   Psychiatric/Behavioral:  Negative for confusion and sleep disturbance.         Physical Exam  Constitutional:       General: She is awake.      Appearance: Normal appearance.   HENT:      Head: Normocephalic and atraumatic.      Nose: Nose normal.      Mouth/Throat:      Mouth: Mucous membranes are moist.   Eyes:      Pupils: Pupils are equal, round, and reactive to light.   Neck:      Thyroid: No thyroid mass.      Trachea: Phonation normal.   Cardiovascular:      Rate and Rhythm: Normal rate and regular rhythm.      Heart sounds: Normal heart sounds. No murmur heard.     No gallop.   Pulmonary:      Effort: Pulmonary effort is normal. No  "respiratory distress.      Breath sounds: Normal air entry. No decreased breath sounds, wheezing, rhonchi or rales.   Abdominal:      General: Bowel sounds are normal. There is no distension.      Palpations: Abdomen is soft.      Tenderness: There is no abdominal tenderness.   Musculoskeletal:      Cervical back: Neck supple.      Right lower leg: No edema.      Left lower leg: No edema.   Skin:     General: Skin is warm.      Capillary Refill: Capillary refill takes less than 2 seconds.   Neurological:      General: No focal deficit present.      Mental Status: She is alert and oriented to person, place, and time. Mental status is at baseline.      Cranial Nerves: Cranial nerves 2-12 are intact.      Motor: Motor function is intact.   Psychiatric:         Attention and Perception: Attention and perception normal.         Mood and Affect: Mood normal.         Speech: Speech normal.         Behavior: Behavior normal.          Last Recorded Vitals  Blood pressure 150/90, pulse 94, temperature 36.3 °C (97.3 °F), temperature source Temporal, resp. rate 18, height 1.575 m (5' 2\"), weight 118 kg (259 lb 0.7 oz), SpO2 98%.    Assessment/Plan   Screening colonoscopy     Julianna England MD  "

## 2024-08-27 NOTE — ANESTHESIA POSTPROCEDURE EVALUATION
Patient: Yajaira Obrien    Procedure Summary       Date: 08/27/24 [facility]    Anesthesia Start: 1309 Anesthesia Stop: 1404    Procedure: COLONOSCOPY Diagnosis: Encounter for screening colonoscopy    Scheduled Providers: Julianna England MD; Jefe Sue MD; KUN Sky-CRNA Responsible Provider: Jefe Sue MD    Anesthesia Type: MAC ASA Status: 3            Anesthesia Type: MAC    Vitals Value Taken Time   BP 99/66 08/27/24 1352   Temp 36.2 °C (97.2 °F) 08/27/24 1352   Pulse 95 08/27/24 1407   Resp 17 08/27/24 1407   SpO2 99 % 08/27/24 1407       Anesthesia Post Evaluation    Patient location during evaluation: bedside  Patient participation: complete - patient cannot participate  Level of consciousness: awake  Pain score: 0  Pain management: adequate  Airway patency: patent  Cardiovascular status: acceptable  Respiratory status: acceptable  Hydration status: acceptable  Postoperative Nausea and Vomiting: none        No notable events documented.

## 2024-08-27 NOTE — PERIOPERATIVE NURSING NOTE
1352: Phase 2 care begins  1355: Dr. England bedside speaking to pt  1410: Discharge instructions reviewed with pt and   1431: IV removed  1439: Pt transported to [facility]

## 2024-08-27 NOTE — ANESTHESIA PREPROCEDURE EVALUATION
Patient: Yajaira Obrien    Procedure Information       Date/Time: 08/27/24 1300    Scheduled providers: Julianna England MD; Jefe Sue MD; KACI Sky    Procedure: COLONOSCOPY    Location: [facility]            Relevant Problems   Anesthesia (within normal limits)      Cardiac   (+) Hyperlipidemia      Pulmonary  Env allergies   (+) Mild intermittent asthma (HHS-HCC)   (+) FRANCISCO (obstructive sleep apnea) (Noncompliant w CPAP)      Neuro   (+) Lumbar radiculopathy, chronic (R > L LE)      GI  constipation      /Renal (within normal limits)      Liver (within normal limits)      Endocrine   (+) Morbid obesity (Multi)      Musculoskeletal   (+) Chronic low back pain   (+) Lumbar spondylosis      ID   (+) Herpes genitalia      GYN   (+) Abnormal uterine bleeding (AUB)       Clinical information reviewed:   Tobacco  Allergies  Meds   Med Hx  Surg Hx  OB Status  Fam Hx  Soc   Hx        NPO Detail:  NPO/Void Status  Carbohydrate Drink Given Prior to Surgery? : N  Date of Last Liquid: 08/27/24  Time of Last Liquid: 0930  Date of Last Solid: 08/25/24  Last Intake Type: Clear fluids  Time of Last Void: 1203         Physical Exam    Airway  Mallampati: II     Cardiovascular    Dental    Pulmonary    Abdominal            Anesthesia Plan    History of general anesthesia?: yes  History of complications of general anesthesia?: no    ASA 3     MAC     intravenous induction   Anesthetic plan and risks discussed with patient.

## 2024-09-04 LAB
LABORATORY COMMENT REPORT: NORMAL
PATH REPORT.FINAL DX SPEC: NORMAL
PATH REPORT.GROSS SPEC: NORMAL
PATH REPORT.RELEVANT HX SPEC: NORMAL
PATH REPORT.TOTAL CANCER: NORMAL

## 2024-10-01 ENCOUNTER — HOSPITAL ENCOUNTER (OUTPATIENT)
Dept: RADIOLOGY | Facility: CLINIC | Age: 49
Discharge: HOME | End: 2024-10-01
Payer: COMMERCIAL

## 2024-10-01 ENCOUNTER — OFFICE VISIT (OUTPATIENT)
Dept: ORTHOPEDIC SURGERY | Facility: CLINIC | Age: 49
End: 2024-10-01
Payer: COMMERCIAL

## 2024-10-01 VITALS — BODY MASS INDEX: 47.84 KG/M2 | WEIGHT: 260 LBS | HEIGHT: 62 IN

## 2024-10-01 DIAGNOSIS — M54.50 LUMBAR PAIN: ICD-10-CM

## 2024-10-01 DIAGNOSIS — S39.012A STRAIN OF LUMBAR REGION, INITIAL ENCOUNTER: Primary | ICD-10-CM

## 2024-10-01 PROCEDURE — 1036F TOBACCO NON-USER: CPT | Performed by: FAMILY MEDICINE

## 2024-10-01 PROCEDURE — 72100 X-RAY EXAM L-S SPINE 2/3 VWS: CPT | Performed by: RADIOLOGY

## 2024-10-01 PROCEDURE — 3008F BODY MASS INDEX DOCD: CPT | Performed by: FAMILY MEDICINE

## 2024-10-01 PROCEDURE — 99204 OFFICE O/P NEW MOD 45 MIN: CPT | Performed by: FAMILY MEDICINE

## 2024-10-01 PROCEDURE — 72100 X-RAY EXAM L-S SPINE 2/3 VWS: CPT

## 2024-10-01 NOTE — PROGRESS NOTES
History of Present Illness   Chief Complaint   Patient presents with    Lower Back - Pain     Pt had a fall on 9/13/24  +sciatica LT>RT         The patient is 48 y.o. female  here with a complaint of low back pain.  Patient has history of some chronic low back pain, she has been previously following with Dr. Diaz, seen by him in August 2023, she has had x-rays, MRI that showed some lumbar spondylosis with some canal and foraminal narrowing prominent at lower lumbar spine.  She has had prior epidural injections with some relief.  She says over the past year she has been increasing her activity level, walking for exercise, she has been doing home exercise stretches, she feels like those have been beneficial and thought symptoms were moving in the right direction.  Around 2 and half weeks ago she was in a public restroom, says she was pulling her pants up when she slipped and landed on her backside, denies hitting her back on anything that she can recall.  She was in [place] at that time driving home, says she had minimal pain initially, made the drive home, the next day had some mild soreness, the following day 2 days after her fall she had pretty significant pain in the right back and upper buttocks which has persisted.  She has continued with her exercises, she is continued walking and has seen minimal improvement.  She denies any significant radiation of pain down her leg, no lower extremity numbness or weakness.  She has tried various medications in the past that have not been beneficial so she has not been taking any pain medications regularly for her current symptoms.  He denies any bowel or bladder dysfunction, no saddle anesthesia.    Past Medical History:   Diagnosis Date    Allergies     Hyperlipidemia        Medication Documentation Review Audit       Reviewed by Maria Eugenia Mike RN (Registered Nurse) on 08/27/24 at 1200      Medication Order Taking? Sig Documenting Provider Last Dose Status    atorvastatin (Lipitor) 10 mg tablet [id number] Yes Take 1 tablet (10 mg) by mouth once daily. EDA Weber 8/26/2024 Active   cetirizine (ZyrTEC) 10 mg tablet [id number] Yes Take 1 tablet (10 mg) by mouth if needed for allergies or rhinitis. EDA Weber 8/26/2024 Active   fluticasone (Flonase) 50 mcg/actuation nasal spray [id number] Yes Administer 1 spray into each nostril once daily. Shake gently. Before first use, prime pump. After use, clean tip and replace cap. EDA Weber 8/26/2024 Active   multivitamin tablet [id number] Yes Take 1 tablet by mouth once daily. Gummy multivitamin Historical Provider, MD 8/26/2024 Active   UNABLE TO FIND [id number] Yes OTC probiotic Historical Provider, MD 8/26/2024 Active                    Allergies   Allergen Reactions    Azithromycin Unknown    Ciprofloxacin Unknown    Sulfa (Sulfonamide Antibiotics) Unknown       Social History     Socioeconomic History    Marital status:      Spouse name: Not on file    Number of children: Not on file    Years of education: Not on file    Highest education level: Not on file   Occupational History    Not on file   Tobacco Use    Smoking status: Never    Smokeless tobacco: Never   Vaping Use    Vaping status: Never Used   Substance and Sexual Activity    Alcohol use: Yes    Drug use: Not Currently    Sexual activity: Not on file   Other Topics Concern    Not on file   Social History Narrative    Not on file     Social Determinants of Health     Financial Resource Strain: Not on file   Food Insecurity: Not on file   Transportation Needs: Not on file   Physical Activity: Not on file   Stress: Not on file   Social Connections: Not on file   Intimate Partner Violence: Not on file   Housing Stability: Not on file       Past Surgical History:   Procedure Laterality Date    HYSTERECTOMY            Review of Systems   GENERAL: Negative  GI: Negative  MUSCULOSKELETAL: See HPI  SKIN:  Negative  NEURO:  Negative     Physical Exam:    General/Constitutional: well appearing, no distress, appears stated age  HEENT: sclera clear  Respiratory: non labored breathing  Vascular: No edema, swelling or tenderness, except as noted in detailed exam.  Integumentary: No impressive skin lesions present, except as noted in detailed exam.  Neurological:  Alert and oriented   Psychological:  Normal mood and affect.  Musculoskeletal: Normal, except as noted in detailed exam and in HPI      Lumbar spine: Normal appearance. No midline tenderness.  Positive right-sided lumbar paraspinal muscle tenderness diffusely.  There are some mild right-sided SI joint tenderness, there is some tenderness into the buttocks region and piriformis.  Good range of motion with flexion without pain, slight limited with extension with some exacerbation of her right-sided low back pain.  Good mobility with bilateral twisting and sidebending without pain.. Negative straight leg testing bilaterally. Negative slump test.. 2+ patella and ankle jerk reflexes. Negative clonus. Sensation intact to light touch throughout bilateral lower extremity. No lower extremity motor deficits.     Imaging: X-rays of lumbar spine obtained today and independently reviewed, no acute abnormalities are seen, no fractures identified, stable appearance of lower lumbar degenerative changes most notable at L5-S1, there is disc height loss, there is facet arthropathy      Assessment   1. Strain of lumbar region, initial encounter  Referral to Physical Therapy      2. Lumbar pain  XR lumbar spine 2-3 views    Referral to Physical Therapy            Plan: Acute on chronic low back pain, does have known lumbar spondylosis, likely has acute low back strain with fall, x-rays negative for fracture.  Discussed further workup and treatment, x-rays were reviewed.  I did place referral to physical therapy, may benefit from some soft tissue modalities in addition to some  continued strengthening, stretching exercises for the lumbar spine.  She was not interested in any prescription medication at this time, they have not been beneficial in the past.  She was interested in following back up with Dr. Diaz, told patient that I would reach out to him with regards to today's visit and they may be able to get her in sooner for follow-up as he is booking out quite a ways at this time.

## 2024-12-05 ENCOUNTER — APPOINTMENT (OUTPATIENT)
Dept: PRIMARY CARE | Facility: CLINIC | Age: 49
End: 2024-12-05
Payer: COMMERCIAL

## 2024-12-05 VITALS
BODY MASS INDEX: 48.83 KG/M2 | DIASTOLIC BLOOD PRESSURE: 88 MMHG | TEMPERATURE: 97.3 F | HEART RATE: 94 BPM | OXYGEN SATURATION: 98 % | SYSTOLIC BLOOD PRESSURE: 136 MMHG | WEIGHT: 267 LBS

## 2024-12-05 DIAGNOSIS — E66.01 MORBID OBESITY (MULTI): ICD-10-CM

## 2024-12-05 DIAGNOSIS — G89.29 CHRONIC RIGHT-SIDED LOW BACK PAIN WITH RIGHT-SIDED SCIATICA: Primary | ICD-10-CM

## 2024-12-05 DIAGNOSIS — M54.41 CHRONIC RIGHT-SIDED LOW BACK PAIN WITH RIGHT-SIDED SCIATICA: Primary | ICD-10-CM

## 2024-12-05 PROCEDURE — 99213 OFFICE O/P EST LOW 20 MIN: CPT | Performed by: NURSE PRACTITIONER

## 2024-12-05 ASSESSMENT — PAIN SCALES - GENERAL: PAINLEVEL_OUTOF10: 6

## 2024-12-05 ASSESSMENT — ENCOUNTER SYMPTOMS: DEPRESSION: 0

## 2024-12-05 NOTE — PROGRESS NOTES
Subjective   Patient ID: Yajaira Obrien is a 49 y.o. female who presents for Weight Loss and Back Pain.    HPI   Brook established 47 y/o female with a PMH of HLD, tinnitus of right ear, morbid obesity, chronic back pain, Lumbar spondylosis, Lumbar stenosis with neurogenic claudication and lumbar radiculopathy who presents for interest in weight loss to help with Chronic low back pain    Current weight 267#, increased 7# from last documentation. Has been considering Bariatric surgery, feels weight loss would help with her chronic low back pain. Looked in to injections and too expensive, not covered by her insurance.  Has tried several programs, [facility], [facility] also   Nutritionist seen 1-2 years ago.    Lumbar spondylosis, Lumbar stenosis with neurogenic claudication and lumbar radiculopathy - follows with Pain management, not currently taking any medication, does not want to take pills. Thinking about CBD, has tried topicals with mild relief which we discussed today. Stretches daily, yoga    Review of Systems  Review of Systems   Constitutional: Negative.    Respiratory: Negative.     Cardiovascular: Negative.    Gastrointestinal: Negative.    Musculoskeletal: HPI  Psychiatric/Behavioral: Negative.     All other systems reviewed and are negative.    Objective   /88 (BP Location: Left arm, Patient Position: Sitting)   Pulse 94   Temp 36.3 °C (97.3 °F)   Wt 121 kg (267 lb)   SpO2 98%   BMI 48.83 kg/m²     Physical Exam  Physical Exam  Vitals reviewed.   Constitutional:       General: She is active.   HENT:      Head: Normocephalic and atraumatic.   Cardiovascular:      Rate and Rhythm: Normal rate and regular rhythm.   Pulmonary:      Effort: Pulmonary effort is normal.      Breath sounds: Normal breath sounds.   Musculoskeletal:         General: Normal range of motion.      Cervical back: Neck supple.   Neurological:      General: No focal deficit present.      Mental Status: She is alert.     Assessment/Plan    Problem List Items Addressed This Visit             ICD-10-CM    Chronic low back pain - Primary M54.50, G89.29    Morbid obesity (Multi) E66.01    BMI 45.0-49.9, adult (Multi) Z68.42    Relevant Orders    Referral to Bariatric Surgery

## 2024-12-11 ENCOUNTER — TELEPHONE (OUTPATIENT)
Dept: SURGERY | Facility: CLINIC | Age: 49
End: 2024-12-11
Payer: COMMERCIAL

## 2024-12-12 DIAGNOSIS — G47.33 OSA (OBSTRUCTIVE SLEEP APNEA): Primary | ICD-10-CM

## 2024-12-16 DIAGNOSIS — G47.33 OSA (OBSTRUCTIVE SLEEP APNEA): Primary | ICD-10-CM

## 2025-01-06 NOTE — PROGRESS NOTES
Subjective     Date: 1/17/2025 Time: 8:36 AM  Name: Yajaira Obrien  MRN: [id number]    This is a 49 y.o. female with morbid obesity (Body mass index is 51.69 kg/m².) who presents to clinic for consideration of bariatric surgery. she has attempted and failed multiple diet and exercise regimens for weight loss. Initial Onset of obesity was at age 19 and after pregnancy.  Their goal for surgery is to  be healthier  and lose weight. The patient has tried multiple diets to lose weight including Low Carb, Low Calorie, and Portioins . The patient was most successful with the  low carb diet. The most pounds lost on this diet were 40 lbs. The patient considers their dietary weakness to be carbs The patient reports a  highest weight ever of 300 pounds and lowest weight ever of 212 pounds Distribution of Obesity: is central. Current diet: none. Compliance: n/a Diet Problems: High Caloric Intake } Dietary Details Include:Dietary Details: 0 Servings of Fruit/Day, 0 Servings of Vegetables/Day, 1-2 Servings of Meat/Day, 1-2 Servings of Whole Grains/Day, 3-4 Servings of Simple Carbs/Day, 32 Ounces of Water/Day , 0-1 Servings of Dairy/Day, 1-2 Cups of Tea/Day, and 1 Cans of Regular Soda/Day The patient exercises daily  30-40 Minutes/Day Types of Exercise : aerobic conditioning  and stretching    Comorbidities: asthmauses prn inhaler/medication, back paintakes NSAIDS, high cholesterol, joint paintakes NSAIDs, and sleep apnea not using an appliance   Patient Active Problem List   Diagnosis    Allergies    Chronic low back pain    Lumbar radiculopathy, chronic    Morbid obesity (Multi)    Retrolisthesis of vertebrae    Tail bone pain    Tinnitus of right ear    Lumbar spondylosis    Muscle spasms of both lower extremities    Abnormal uterine bleeding (AUB)    Herpes genitalia    Hyperlipidemia    Migraine headache    Mild intermittent asthma    S/P RASHAUN (total abdominal hysterectomy)    Annual physical exam    Impacted cerumen of both ears     Colon cancer screening    Muscle cramp, nocturnal    FRANCISCO (obstructive sleep apnea)    BMI 45.0-49.9, adult (Multi)       Krystyna-en-Y Gastric Bypass    0 = No symptoms    PMH:   Past Medical History:   Diagnosis Date    Allergies     Hyperlipidemia         PSH:   Past Surgical History:   Procedure Laterality Date    HYSTERECTOMY            no personal/family hx of VTE.    FAMILY HISTORY:  No family history on file.     SOCIAL HISTORY:  Social History     Tobacco Use    Smoking status: Never    Smokeless tobacco: Never   Vaping Use    Vaping status: Never Used   Substance Use Topics    Alcohol use: Yes     Comment: wine approx 2 x month    Drug use: Not Currently       MEDICATIONS:  Prior to Admission Medications:  Medication Documentation Review Audit       Reviewed by Shannon Colin CMA (Medical Assistant) on 01/17/25 at 0833      Medication Order Taking? Sig Documenting Provider Last Dose Status   Discontinued 01/16/25 1823   atorvastatin (Lipitor) 10 mg tablet [id number] Yes TAKE 1 TABLET (10mg) BY MOUTH ONCE DAILY Fabienne Rosa APRN-CNP  Active   cetirizine (ZyrTEC) 10 mg tablet [id number] Yes Take 1 tablet (10 mg) by mouth if needed for allergies or rhinitis. EDA Weber 8/26/2024 Active   fluticasone (Flonase) 50 mcg/actuation nasal spray [id number] Yes Administer 1 spray into each nostril once daily. Shake gently. Before first use, prime pump. After use, clean tip and replace cap. DEA Weber 8/26/2024 Active   multivitamin tablet [id number] Yes Take 1 tablet by mouth once daily. Gummy multivitamin Historical Provider, MD 8/26/2024 Active   UNABLE TO FIND [id number] Yes OTC probiotic Historical Provider, MD 8/26/2024 Active                     ALLERGIES:  Allergies   Allergen Reactions    Azithromycin Unknown    Ciprofloxacin Unknown    Sulfa (Sulfonamide Antibiotics) Unknown       REVIEW OF SYSTEMS:  GENERAL: Negative for malaise, significant weight loss and  "fever  HEAD: Negative for headache, swelling.  NECK: Negative for lumps, goiter, pain and significant neck swelling  RESPIRATORY: Negative for cough, wheezing or shortness of breath.  CARDIOVASCULAR: Negative for chest pain, leg swelling or palpitations.  GI: Negative for abdominal discomfort, blood in stools or black stools or change in bowel habits  : No history of dysuria, frequency or incontinence  MUSCULOSKELETAL: Negative for joint pain or swelling, back pain or muscle pain.  SKIN: Negative for lesions, rash, and itching.  PSYCH: Negative for sleep disturbance, mood disorder and recent psychosocial stressors.  ENDOCRINE: Negative for cold or heat intolerance, polyuria, polydipsia and goiter.    Objective   PHYSICAL EXAM:  Visit Vitals  /82 (BP Location: Left leg, Patient Position: Sitting, BP Cuff Size: Thigh)   Pulse 87   Ht 1.556 m (5' 1.25\")   Wt 125 kg (275 lb 12.8 oz)   SpO2 97%   BMI 51.69 kg/m²   OB Status Hysterectomy   Smoking Status Never   BSA 2.32 m²     General appearance: obese, NAD  Neuro: AOx3  Head: EOMI; no swelling or lesions of scalp or face  ENT:  no gross lesions  Skin: warm, no erythema or rashes  Lungs: clear   Heart: regular rhythm   Abdomen: soft, non-tender  Extremities: No edema   Psych: no hurried speech, no flight of ideas, normal affect    Assessment/Plan   IMPRESSION:  Yajaira Obrien is a 49 y.o. female with a BMI of Body mass index is 51.69 kg/m². with the following diagnoses and co-morbidities:     Past Medical History:   Diagnosis Date    Allergies     Hyperlipidemia        This patient does meet the criteria for a surgical weight loss procedure according to NIH guidelines.    The risks of sleeve gastrectomy, Krystyna-en-Y gastric bypass surgery including but not limited to bleeding, leak along staple lines, infection, dehydration, ulcers, internal hernia, DVT/PE, pneumonia, myocardial infarction, prolonged nausea/vomiting, incomplete resolution of associated medical " conditions, reflux, weight regain, vitamin/mineral deficiencies, and death have been explained to the patient and Yajaira Obrien has expressed understanding and acceptance of them.     We discussed the lifestyle changes necessary to be successful following surgery.    After discussing the procedures and options she wants to proceed with sleeve gastrectomy.    Post sleeve gastrectomy GERD, possible need for additional procedures or gastric bypass etc. discussed, patient in agreement.    The increased risk of substance and alcohol abuse following bariatric surgery was discussed with the patient, along with the negative consequences of substance/alcohol use after surgery including addiction, worsening of mental health disorders, and injury to the stomach. The risk of smoking and vaping (tobacco or any other substance) after bariatric surgery was explained to the patient. This includes risk of anastamotic ulcers, gastritis, bleeding, perforation, stricture, and PO intolerance.  The patient expressed understanding and acceptance of these risks.      The possible benefits of the above surgeries including weight loss, improvement/resolution of associated medical and mental health conditions, improved mobility, and decreased mortality have been explained the the patient and Yajaira Obrien has expressed understanding and acceptance of them.      PLAN:  The plan of treatment for Yajaira Obrien is to continue with the consultations and tests ordered today in hopes of qualifying for pre-operative clearance for bariatric surgery. This includes:    Consult Nutrition for education  Consult Psychology  Consult Cardiology  Labs ordered  EGD  PCP for medical optimization  Consult sleep medicine - concern for FRANICSCO  Recommend at least 10 lbs of weight loss prior to surgery.

## 2025-01-09 ENCOUNTER — CLINICAL SUPPORT (OUTPATIENT)
Dept: SLEEP MEDICINE | Facility: CLINIC | Age: 50
End: 2025-01-09
Payer: COMMERCIAL

## 2025-01-09 DIAGNOSIS — G47.33 OSA (OBSTRUCTIVE SLEEP APNEA): ICD-10-CM

## 2025-01-09 NOTE — PROGRESS NOTES
Surgeon: Luis  Patient is considering:  Gastric bypass    ASSESSMENT:  Current weight:  275.7 Ht: 61.2 in   BMI:  48.8        Initial start weight: 275.7  Pre-Op Excess Body Weight (EBW):   143.7  Target Post-Op weight goal:         Food allergies/intolerances:   none  Chewing/Swallowing/Dentition:   no issues  Diarrhea/ Constipation:  +constipation weekly, drinks  tea and mushroom coffee  Exercise level:   cycling/yoga for  20  min  3-4x/week  Smoking/Tobacco use:  none  Vitamins/Minerals supplements:  Women's one a day gummy, probiotics  Past diet attempts:    IF, low carb, Boot camp  and low carb-lost 50 pounds, 10 day smoothie challenge and walking-lost 43 pounds.   Hours of sleep/night: 6-8    24 HOUR RECALL/DIET HISTORY:  Breakfast:   2 slices robert 2 eggs, 2 slices Divehi toast, potatoes  Snack:    eggnog ice cream  Lunch:    none  Snack:   popcorn  Dinner:   2 slices pizza  Snack:  2 Bo's PB cup  Beverages:   12  oz soda/month,  8 oz tea 3x/week, 6-9 oz juice in smoothe   2x/month, 32 oz water/day  Alcohol:  16 oz wine 2x/month    Person responsible for cooking & shopping?  Pt does both  How often do you eat sweet snacks?    3x/week  How often do you eat savory snacks?    2x/month  How often do you eat out?    1x/week  Do you feel overly stuffed?    no  Binge Eating?  no  Night Eating?    no  Emotional Eating?   Yes when bored or sad; no specific go to foods        READINESS TO LEARN:  Motivation to learn: Interested        Understanding of instruction: Good   Anticipated Compliance: Good   Family Support: Unable to assess-family not present          Educational Materials Provided:    Schedules for MSWL class and support group   Calorie meal plan   Goals sheet    Nutrition assessment completed today.  Pt will be scheduled for a video education class at a later date to discuss the 2 week pre op diet, post op protein and fluid goals, vitamin and mineral supplementation, exercise goals, and post op diet  progression.     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      Pt's weight started to become problematic after having her children.  Pt is able to lose weight but gains it back.  She has back pain which hinders quality of exercise.  Carbs are her weakness.     Recommend following  1500 calorie meal plan.  Recommend eating 3 meals that include 3-4 oz protein and 2 high protein  snacks daily. Recommend eating according to a schedule.  Reviewed the postop behaviors to start practicing.      Patient was receptive to nutritional recommendations, asked numerous questions, and verbalized understanding of the weight loss surgery diet.  Patient expressed understanding about the importance of strict dietary compliance post-surgery to avoid nutritional deficiencies and achieve optimal weight loss and verbalized intent to follow dietary recommendations.    Malnutrition Screening:  Significant unintentional weight loss? n/a   Eating less than 75% of usual intake for more than 2 weeks? n/a      Nutrition Diagnosis:   1. Overweight/obesity related to excess energy intake as evidenced by BMI = 40 kg/m^2.  2. Food- and nutrition-related knowledge deficit related to lack of prior exposure to surgical weight loss information as evidenced by pt new to surgical program.    Nutrition Interventions:   1. Modify type and amount of food and nutrients within meals and snacks.  2. Comprehensive Nutrition Education    Recommendations:  1. Begin following your meal plan.  Measure and record intake daily.   2. Structure meal patterns, eating three meals and 1-2 snacks per day.  3. Aim for 3-4 oz protein per meal.  Have 2 high protein snacks that are 10-20 g protein each.  You can try a tuna or chicken packet, Greek yogurt, 2 string cheeses, Protein bars like Quest, Pure Protein, Premier, or Built Bars. you can also try protein chips form Quest or  Atkins.    4. Drink 64oz of calorie-free, caffeine-free, and non-carbonated beverages. Stop drinking anything with sugar and carbonation.   5. Practice no drinking 30 minutes before meals, nothing with meals and wait 30 minutes after meals to drink again. Make meals last at least 20-30 minutes-chew thoroughly.   6. Limit or omit eating out/sweets/savory snacks to 1-2 times per week.  7. When you finish your gummy multivitamin, switch to a non-gummy.  You can try Centrum Adult tablet.   8. Increase physical activity by 10-15 minutes as tolerated to an end goal of 60 minutes 5 x per week. Consistency is the key.  9. Attend monthly Zoom bariatric support groups.      Pre-op Goal weight: lose 5% of body weight    Nutrition Monitoring and Evaluation: 1-2 pound weight loss per week  Criteria: weight check  Need for Follow-up:     Patient does meet National Institutes Health guidelines for weight loss surgery, however needs to demonstrate consistent effort in making dietary changes before giving clearance. It is anticipated that the patient will need at least 2  nutritional follow-up visits prior to clearance for surgery.    Fatuma Shaw RD, LD, [facility]  [phone]

## 2025-01-09 NOTE — PROGRESS NOTES
Type of Study: HOME SLEEP STUDY - NOMAD     The patient received equipment and instructions for use of the [facility] Nomad HSAT device. The patient was instructed how to apply the effort belts, cannula, thermistor. It was also explained how the Nomad and oximeter components work.  The patient was asked to record their sleep for an 8-hour period.     The patient was informed of their responsibility for the device and acknowledged this by signing the HSAT device contract. The patient was asked to return the device on 1/10/2025 between the hours of 6:30 AM- 6:30 PM to the [facility].     The patient was instructed to call 911 as usual for any medical- emergencies while at home.  The patient was also given a phone number for troubleshooting when using the device in case there were additional questions.

## 2025-01-16 DIAGNOSIS — E78.2 MIXED HYPERLIPIDEMIA: ICD-10-CM

## 2025-01-16 RX ORDER — ATORVASTATIN CALCIUM 10 MG/1
TABLET, FILM COATED ORAL
Qty: 90 TABLET | Refills: 2 | Status: SHIPPED | OUTPATIENT
Start: 2025-01-16

## 2025-01-17 ENCOUNTER — OFFICE VISIT (OUTPATIENT)
Dept: SURGERY | Facility: CLINIC | Age: 50
End: 2025-01-17
Payer: COMMERCIAL

## 2025-01-17 ENCOUNTER — NUTRITION (OUTPATIENT)
Dept: SURGERY | Facility: CLINIC | Age: 50
End: 2025-01-17
Payer: COMMERCIAL

## 2025-01-17 VITALS
DIASTOLIC BLOOD PRESSURE: 82 MMHG | BODY MASS INDEX: 52.07 KG/M2 | HEIGHT: 61 IN | SYSTOLIC BLOOD PRESSURE: 134 MMHG | WEIGHT: 275.8 LBS | OXYGEN SATURATION: 97 % | HEART RATE: 87 BPM

## 2025-01-17 DIAGNOSIS — Z71.3 ENCOUNTER FOR NUTRITION EVALUATION PRIOR TO BARIATRIC SURGERY: ICD-10-CM

## 2025-01-17 DIAGNOSIS — G47.33 OSA (OBSTRUCTIVE SLEEP APNEA): ICD-10-CM

## 2025-01-17 DIAGNOSIS — E66.01 MORBID OBESITY (MULTI): Primary | ICD-10-CM

## 2025-01-17 DIAGNOSIS — E78.2 MIXED HYPERLIPIDEMIA: ICD-10-CM

## 2025-01-17 DIAGNOSIS — Z13.21 ENCOUNTER FOR VITAMIN DEFICIENCY SCREENING: ICD-10-CM

## 2025-01-17 PROCEDURE — 99215 OFFICE O/P EST HI 40 MIN: CPT | Performed by: SURGERY

## 2025-01-17 PROCEDURE — 99205 OFFICE O/P NEW HI 60 MIN: CPT | Performed by: SURGERY

## 2025-01-17 PROCEDURE — 3008F BODY MASS INDEX DOCD: CPT | Performed by: SURGERY

## 2025-01-17 PROCEDURE — 1036F TOBACCO NON-USER: CPT | Performed by: SURGERY

## 2025-01-20 DIAGNOSIS — G47.33 OSA (OBSTRUCTIVE SLEEP APNEA): Primary | ICD-10-CM

## 2025-01-21 ENCOUNTER — LAB (OUTPATIENT)
Dept: LAB | Facility: LAB | Age: 50
End: 2025-01-21
Payer: COMMERCIAL

## 2025-01-21 DIAGNOSIS — E66.01 MORBID OBESITY (MULTI): ICD-10-CM

## 2025-01-21 LAB
25(OH)D3 SERPL-MCNC: 33 NG/ML (ref 30–100)
ALBUMIN SERPL BCP-MCNC: 3.8 G/DL (ref 3.4–5)
ALP SERPL-CCNC: 82 U/L (ref 33–110)
ALT SERPL W P-5'-P-CCNC: 14 U/L (ref 7–45)
AMPHETAMINES UR QL SCN: NORMAL
ANION GAP SERPL CALC-SCNC: 12 MMOL/L (ref 10–20)
APTT PPP: 32 SECONDS (ref 27–38)
AST SERPL W P-5'-P-CCNC: 18 U/L (ref 9–39)
BARBITURATES UR QL SCN: NORMAL
BASOPHILS # BLD AUTO: 0.05 X10*3/UL (ref 0–0.1)
BASOPHILS NFR BLD AUTO: 0.8 %
BENZODIAZ UR QL SCN: NORMAL
BILIRUB SERPL-MCNC: 0.5 MG/DL (ref 0–1.2)
BUN SERPL-MCNC: 14 MG/DL (ref 6–23)
BZE UR QL SCN: NORMAL
C PEPTIDE SERPL-MCNC: 2.5 NG/ML (ref 0.7–3.9)
CALCIUM SERPL-MCNC: 8.9 MG/DL (ref 8.6–10.6)
CANNABINOIDS UR QL SCN: NORMAL
CHLORIDE SERPL-SCNC: 104 MMOL/L (ref 98–107)
CHOLEST SERPL-MCNC: 198 MG/DL (ref 0–199)
CHOLESTEROL/HDL RATIO: 3.9
CO2 SERPL-SCNC: 29 MMOL/L (ref 21–32)
CREAT SERPL-MCNC: 0.59 MG/DL (ref 0.5–1.05)
EGFRCR SERPLBLD CKD-EPI 2021: >90 ML/MIN/1.73M*2
EOSINOPHIL # BLD AUTO: 0.33 X10*3/UL (ref 0–0.7)
EOSINOPHIL NFR BLD AUTO: 5.1 %
ERYTHROCYTE [DISTWIDTH] IN BLOOD BY AUTOMATED COUNT: 12.1 % (ref 11.5–14.5)
EST. AVERAGE GLUCOSE BLD GHB EST-MCNC: 103 MG/DL
FENTANYL+NORFENTANYL UR QL SCN: NORMAL
FERRITIN SERPL-MCNC: 217 NG/ML (ref 8–150)
FOLATE SERPL-MCNC: 17.5 NG/ML
GLUCOSE SERPL-MCNC: 99 MG/DL (ref 74–99)
HBA1C MFR BLD: 5.2 %
HCT VFR BLD AUTO: 39.2 % (ref 36–46)
HDLC SERPL-MCNC: 51 MG/DL
HGB BLD-MCNC: 12.9 G/DL (ref 12–16)
IMM GRANULOCYTES # BLD AUTO: 0.01 X10*3/UL (ref 0–0.7)
IMM GRANULOCYTES NFR BLD AUTO: 0.2 % (ref 0–0.9)
INR PPP: 1 (ref 0.9–1.1)
IRON SATN MFR SERPL: 39 % (ref 25–45)
IRON SERPL-MCNC: 119 UG/DL (ref 35–150)
LDLC SERPL CALC-MCNC: 123 MG/DL
LYMPHOCYTES # BLD AUTO: 3.39 X10*3/UL (ref 1.2–4.8)
LYMPHOCYTES NFR BLD AUTO: 52.1 %
MCH RBC QN AUTO: 30.6 PG (ref 26–34)
MCHC RBC AUTO-ENTMCNC: 32.9 G/DL (ref 32–36)
MCV RBC AUTO: 93 FL (ref 80–100)
METHADONE UR QL SCN: NORMAL
MONOCYTES # BLD AUTO: 0.46 X10*3/UL (ref 0.1–1)
MONOCYTES NFR BLD AUTO: 7.1 %
NEUTROPHILS # BLD AUTO: 2.27 X10*3/UL (ref 1.2–7.7)
NEUTROPHILS NFR BLD AUTO: 34.7 %
NON HDL CHOLESTEROL: 147 MG/DL (ref 0–149)
NRBC BLD-RTO: 0 /100 WBCS (ref 0–0)
OPIATES UR QL SCN: NORMAL
OXYCODONE+OXYMORPHONE UR QL SCN: NORMAL
PCP UR QL SCN: NORMAL
PLATELET # BLD AUTO: 313 X10*3/UL (ref 150–450)
POTASSIUM SERPL-SCNC: 4 MMOL/L (ref 3.5–5.3)
PROT SERPL-MCNC: 6.9 G/DL (ref 6.4–8.2)
PROTHROMBIN TIME: 11.8 SECONDS (ref 9.8–12.8)
PTH-INTACT SERPL-MCNC: 39.1 PG/ML (ref 18.5–88)
RBC # BLD AUTO: 4.21 X10*6/UL (ref 4–5.2)
SODIUM SERPL-SCNC: 141 MMOL/L (ref 136–145)
T4 FREE SERPL-MCNC: 1.08 NG/DL (ref 0.78–1.48)
TIBC SERPL-MCNC: 305 UG/DL (ref 240–445)
TRIGL SERPL-MCNC: 119 MG/DL (ref 0–149)
TSH SERPL-ACNC: 3.03 MIU/L (ref 0.44–3.98)
UIBC SERPL-MCNC: 186 UG/DL (ref 110–370)
VIT B12 SERPL-MCNC: 508 PG/ML (ref 211–911)
VLDL: 24 MG/DL (ref 0–40)
WBC # BLD AUTO: 6.5 X10*3/UL (ref 4.4–11.3)

## 2025-01-21 PROCEDURE — 80307 DRUG TEST PRSMV CHEM ANLYZR: CPT

## 2025-01-21 PROCEDURE — 83013 H PYLORI (C-13) BREATH: CPT

## 2025-01-21 PROCEDURE — 83550 IRON BINDING TEST: CPT

## 2025-01-21 PROCEDURE — 84630 ASSAY OF ZINC: CPT

## 2025-01-21 PROCEDURE — 80053 COMPREHEN METABOLIC PANEL: CPT

## 2025-01-21 PROCEDURE — 84439 ASSAY OF FREE THYROXINE: CPT

## 2025-01-21 PROCEDURE — 85730 THROMBOPLASTIN TIME PARTIAL: CPT

## 2025-01-21 PROCEDURE — 83970 ASSAY OF PARATHORMONE: CPT

## 2025-01-21 PROCEDURE — 83540 ASSAY OF IRON: CPT

## 2025-01-21 PROCEDURE — 85610 PROTHROMBIN TIME: CPT

## 2025-01-21 PROCEDURE — 82728 ASSAY OF FERRITIN: CPT

## 2025-01-21 PROCEDURE — 85025 COMPLETE CBC W/AUTO DIFF WBC: CPT

## 2025-01-21 PROCEDURE — 84681 ASSAY OF C-PEPTIDE: CPT

## 2025-01-21 PROCEDURE — 82306 VITAMIN D 25 HYDROXY: CPT

## 2025-01-21 PROCEDURE — 36415 COLL VENOUS BLD VENIPUNCTURE: CPT

## 2025-01-21 PROCEDURE — 84443 ASSAY THYROID STIM HORMONE: CPT

## 2025-01-21 PROCEDURE — 80061 LIPID PANEL: CPT

## 2025-01-21 PROCEDURE — 83036 HEMOGLOBIN GLYCOSYLATED A1C: CPT

## 2025-01-21 PROCEDURE — 82746 ASSAY OF FOLIC ACID SERUM: CPT

## 2025-01-21 PROCEDURE — 84425 ASSAY OF VITAMIN B-1: CPT

## 2025-01-21 PROCEDURE — 80323 ALKALOIDS NOS: CPT

## 2025-01-21 PROCEDURE — 84590 ASSAY OF VITAMIN A: CPT

## 2025-01-21 PROCEDURE — 82525 ASSAY OF COPPER: CPT

## 2025-01-21 PROCEDURE — 82607 VITAMIN B-12: CPT

## 2025-01-22 LAB — UREA BREATH TEST QL: POSITIVE

## 2025-01-23 LAB
COPPER SERPL-MCNC: 155.7 UG/DL (ref 80–155)
VIT B1 PYROPHOSHATE BLD-SCNC: 129 NMOL/L (ref 70–180)
ZINC SERPL-MCNC: 76.8 UG/DL (ref 60–120)

## 2025-01-24 ENCOUNTER — OFFICE VISIT (OUTPATIENT)
Dept: CARDIOLOGY | Facility: HOSPITAL | Age: 50
End: 2025-01-24
Payer: COMMERCIAL

## 2025-01-24 VITALS
SYSTOLIC BLOOD PRESSURE: 143 MMHG | HEART RATE: 82 BPM | OXYGEN SATURATION: 98 % | HEIGHT: 62 IN | BODY MASS INDEX: 50.96 KG/M2 | WEIGHT: 276.9 LBS | DIASTOLIC BLOOD PRESSURE: 85 MMHG

## 2025-01-24 DIAGNOSIS — E78.2 MIXED HYPERLIPIDEMIA: Primary | ICD-10-CM

## 2025-01-24 DIAGNOSIS — E66.01 MORBID OBESITY (MULTI): ICD-10-CM

## 2025-01-24 LAB
ANNOTATION COMMENT IMP: NORMAL
ATRIAL RATE: 82 BPM
COTININE SERPL-MCNC: <5 NG/ML
NICOTINE SERPL-MCNC: <5 NG/ML
P AXIS: 53 DEGREES
P OFFSET: 201 MS
P ONSET: 157 MS
PR INTERVAL: 130 MS
Q ONSET: 222 MS
QRS COUNT: 13 BEATS
QRS DURATION: 86 MS
QT INTERVAL: 394 MS
QTC CALCULATION(BAZETT): 460 MS
QTC FREDERICIA: 437 MS
R AXIS: 81 DEGREES
RETINYL PALMITATE SERPL-MCNC: <0.02 MG/L (ref 0–0.1)
T AXIS: 41 DEGREES
T OFFSET: 419 MS
VENTRICULAR RATE: 82 BPM
VIT A SERPL-MCNC: 0.54 MG/L (ref 0.3–1.2)

## 2025-01-24 PROCEDURE — 3008F BODY MASS INDEX DOCD: CPT | Performed by: INTERNAL MEDICINE

## 2025-01-24 PROCEDURE — 93005 ELECTROCARDIOGRAM TRACING: CPT | Performed by: INTERNAL MEDICINE

## 2025-01-24 PROCEDURE — 99205 OFFICE O/P NEW HI 60 MIN: CPT | Performed by: INTERNAL MEDICINE

## 2025-01-24 PROCEDURE — 99215 OFFICE O/P EST HI 40 MIN: CPT | Performed by: INTERNAL MEDICINE

## 2025-01-24 PROCEDURE — 1036F TOBACCO NON-USER: CPT | Performed by: INTERNAL MEDICINE

## 2025-01-24 NOTE — PROGRESS NOTES
Referred by Dr. Duenas for New Patient Visit     History Of Present Illness:    Yajaira Obrien is a 49 y.o. female with complex PMH (detailed below) presenting to outpatient cardiology clinic for preoperative risk stratification in anticipation of bariatric surgery.  She reports to clinic in which she feels is her usual state of health, is tolerating her outpatient med regimen without difficulty and has no acute cardiovascular complaints.  She tells me that she is able to ascend and descend flights of stairs with relative ease, can walk approximately 2 miles before becoming short of breath and is able to complete all of her activities of daily living without limitation.    Past Medical History:  BMI 51  Hypertension  Dyslipidemia  Seasonal allergies    Review of Systems   Constitutional:  No Weight Change, No Fever, No Chills, No Night Sweats, No Fatigue, No Malaise   ENT/Mouth:  No Hearing Changes, No Ear Pain, No Nasal Congestion, No  Sinus Pain, No Hoarseness, No sore throat, No Rhinorrhea, No Swallowing  Difficulty   Eyes:  No Eye Pain, No Swelling, No Redness, No Foreign Body, No Discharge, No Vision Changes   Cardiovascular:  See HPI   Respiratory:  No Cough, No Sputum, No Wheezing, No Smoke Exposure, No Dyspnea   Gastrointestinal:  No Nausea, No Vomiting, No Diarrhea, No  Constipation, No Pain, No Heartburn, No Anorexia, No Dysphagia, No  Hematochezia, No Melena, No Flatulence, No Jaundice   Genitourinary:  No Dysmenorrhea, No DUB, No Dyspareunia, No Dysuria, No  Urinary Frequency, No Hematuria, No Urinary Incontinence, No Urgency,  No Flank Pain, No Urinary Flow Changes   Musculoskeletal:  No Arthralgias, No Myalgias, No Joint Swelling, No  Joint Stiffness, No Back Pain, No Neck Pain, No Injury History   Skin:  No Skin Lesions, No Pruritis, No Hair Changes, No Breast/Skin Changes, No Nipple Discharge   Neuro:  No Weakness, No Numbness, No Paresthesias, No Loss of  Consciousness, No Syncope, No Dizziness, No  "Headache, No Coordination  Changes, No Recent Falls   Psych:  No Anxiety/Panic, No Depression, No Insomnia, No Delusions, No Rumination, No SI/HI/AH/VH, No Social Issues,  No Memory Changes, No Violence/Abuse Hx., No Eating Concerns   Heme/Lymph:  No Bruising, No Bleeding, No Transfusions History, No Lymphadenopathy   Endocrine:  No Polyuria, No Polydipsia, No Temperature Intolerance        Past Medical History:  She has a past medical history of Allergies and Hyperlipidemia.    Past Surgical History:  She has a past surgical history that includes Hysterectomy.      Social History:  She reports that she has never smoked. She has never used smokeless tobacco. She reports current alcohol use. She reports that she does not currently use drugs.    Family History:  No family history on file.     Allergies:  Azithromycin, Ciprofloxacin, and Sulfa (sulfonamide antibiotics)    Outpatient Medications:  Current Outpatient Medications   Medication Instructions    atorvastatin (Lipitor) 10 mg tablet TAKE 1 TABLET (10mg) BY MOUTH ONCE DAILY    cetirizine (ZYRTEC) 10 mg, oral, As needed    fluticasone (Flonase) 50 mcg/actuation nasal spray 1 spray, Each Nostril, Daily, Shake gently. Before first use, prime pump. After use, clean tip and replace cap.    multivitamin tablet 1 tablet, Daily    UNABLE TO FIND OTC probiotic        Last Recorded Vitals:  Vitals:    01/24/25 0758 01/24/25 0808   BP: (!) 148/98 143/85   BP Location: Right arm Left arm   Pulse: 82 82   SpO2: 98% 98%   Weight: 126 kg (276 lb 14.4 oz)    Height: 1.568 m (5' 1.75\")        Physical Exam:  GEN: calm, cooperative, asking appropriate questions  NEURO: Alert and oriented x3, CN2-12 intact, SILT, Moving all extremities without difficulty  HEENT: atraumatic, no goiter, no JVD, normal uvula   CARDS: PMI is non-displaced, RRR, no MRG  PULM: CTAB, no wheezes / rales / rhonchi   ABD: soft, non-distended, non-tender, non-tympanitic, BS in all 4 quadrants. No " "hepatosplenomegaly  : unremarkable  SKIN: healthy appearing, normal skin turgor   EXT: atraumatic  VASC: b/l radial pulses are brisk and equal            Last Labs:  CBC -  Lab Results   Component Value Date    WBC 6.5 01/21/2025    HGB 12.9 01/21/2025    HCT 39.2 01/21/2025    MCV 93 01/21/2025     01/21/2025       CMP -  Lab Results   Component Value Date    CALCIUM 8.9 01/21/2025    PROT 6.9 01/21/2025    ALBUMIN 3.8 01/21/2025    AST 18 01/21/2025    ALT 14 01/21/2025    ALKPHOS 82 01/21/2025    BILITOT 0.5 01/21/2025       LIPID PANEL -   Lab Results   Component Value Date    CHOL 198 01/21/2025    HDL 51.0 01/21/2025    CHHDL 3.9 01/21/2025    VLDL 24 01/21/2025    TRIG 119 01/21/2025    NHDL 147 01/21/2025       RENAL FUNCTION PANEL -   Lab Results   Component Value Date    K 4.0 01/21/2025       Lab Results   Component Value Date    HGBA1C 5.2 01/21/2025           Last Cardiology Tests:    ECG:  No results found for this or any previous visit (from the past 4464 hours).      Echo:  Echo Results:  No results found for this or any previous visit from the past 365 days.       Ejection Fractions:  No results found for: \"EF\"    Cath:  No results found for this or any previous visit from the past 365 days.        Stress Test:  Stress Results:  No results found for this or any previous visit from the past 365 days.       Cardiac Imaging:  XR lumbar spine 2-3 views  Narrative: Interpreted By:  Maxime Steve,   STUDY:  XR LUMBAR SPINE 2-3 VIEWS; ;  10/1/2024 1:56 pm      INDICATION:  Signs/Symptoms:PAIN.      ,M54.50 Low back pain, unspecified      COMPARISON:  06/28/2023      ACCESSION NUMBER(S):  [id number]      ORDERING CLINICIAN:  KEVIN FLORENTINO      FINDINGS:  Lumbar spine, two views      There is moderate facet disease in the lower lumbar spine worse at  L5-S1. There is mild multilevel osteophytosis without disc space  narrowing. There is mild anterolisthesis L4 on L5. No fracture seen    "   Impression: Moderate facet disease worse at L5-S1  Mild anterolisthesis L4 on L5      MACRO:  None      Signed by: Maxime Steve 10/2/2024 7:20 PM  Dictation workstation:   [id number]      Assessment/Plan   This is a 49-year-old female here for preoperative risk assessment in anticipation of bariatric surgery.  She reports to clinic and what she feels is her usual state of health, denies all red flag cardiovascular symptoms and is able to complete all of her activities of daily living without limitation.  I am reassured by her reported ability to ascend and descend flights of stairs without difficulty as well as her ability to walk a couple miles on flat ground without becoming short of breath.  Please see detailed problem based assessment / plan below.    # Preoperative risk assessment  -Revised cardiac risk index 1 point, associated with approximately 6.0% risk of periprocedural cardiovascular complication  -I discussed this with the patient, who understands that all her risk of perioperative complication is low is not zero  -No additional cardiovascular testing is needed at this time    # Cardiovascular Health Maintenance  - Physical Activity: Encourage 10-15K steps per day  - Healthy Diet: Avoid high fat and high sodium foods (processed meats / fast foods)  --- consider a mediterranean or DASH diet, emphasizing vegetables, fruits, whole grains, lean proteins  - Avoidance of smoking and smoke exposure    Problem List Items Addressed This Visit             ICD-10-CM    Morbid obesity (Multi) E66.01    Hyperlipidemia - Primary E78.5           Time Spent: I spent 40 minutes reviewing medical testing, obtaining medical history and counselling and educating on diagnosis and documenting clinical encounter.   C. Barton Gillombardo, MD  Interventional Cardiology  Pager: [phone]

## 2025-02-12 ENCOUNTER — APPOINTMENT (OUTPATIENT)
Dept: SLEEP MEDICINE | Facility: CLINIC | Age: 50
End: 2025-02-12
Payer: COMMERCIAL

## 2025-02-12 VITALS
WEIGHT: 267.2 LBS | HEIGHT: 62 IN | OXYGEN SATURATION: 100 % | HEART RATE: 78 BPM | RESPIRATION RATE: 18 BRPM | SYSTOLIC BLOOD PRESSURE: 129 MMHG | DIASTOLIC BLOOD PRESSURE: 83 MMHG | BODY MASS INDEX: 49.17 KG/M2

## 2025-02-12 DIAGNOSIS — G47.00 INSOMNIA, UNSPECIFIED TYPE: ICD-10-CM

## 2025-02-12 DIAGNOSIS — E66.01 MORBID OBESITY (MULTI): ICD-10-CM

## 2025-02-12 DIAGNOSIS — G47.33 OSA (OBSTRUCTIVE SLEEP APNEA): Primary | ICD-10-CM

## 2025-02-12 LAB
ATRIAL RATE: 82 BPM
P AXIS: 53 DEGREES
P OFFSET: 201 MS
P ONSET: 157 MS
PR INTERVAL: 130 MS
Q ONSET: 222 MS
QRS COUNT: 13 BEATS
QRS DURATION: 86 MS
QT INTERVAL: 394 MS
QTC CALCULATION(BAZETT): 460 MS
QTC FREDERICIA: 437 MS
R AXIS: 81 DEGREES
T AXIS: 41 DEGREES
T OFFSET: 419 MS
VENTRICULAR RATE: 82 BPM

## 2025-02-12 PROCEDURE — 99244 OFF/OP CNSLTJ NEW/EST MOD 40: CPT | Performed by: STUDENT IN AN ORGANIZED HEALTH CARE EDUCATION/TRAINING PROGRAM

## 2025-02-12 PROCEDURE — 1036F TOBACCO NON-USER: CPT | Performed by: STUDENT IN AN ORGANIZED HEALTH CARE EDUCATION/TRAINING PROGRAM

## 2025-02-12 PROCEDURE — 3008F BODY MASS INDEX DOCD: CPT | Performed by: STUDENT IN AN ORGANIZED HEALTH CARE EDUCATION/TRAINING PROGRAM

## 2025-02-12 ASSESSMENT — ENCOUNTER SYMPTOMS
RESPIRATORY NEGATIVE: 1
CARDIOVASCULAR NEGATIVE: 1
CONSTITUTIONAL NEGATIVE: 1
NEUROLOGICAL NEGATIVE: 1
PSYCHIATRIC NEGATIVE: 1

## 2025-02-12 ASSESSMENT — SLEEP AND FATIGUE QUESTIONNAIRES
HOW LIKELY ARE YOU TO NOD OFF OR FALL ASLEEP WHILE WATCHING TV: SLIGHT CHANCE OF DOZING
SITING INACTIVE IN A PUBLIC PLACE LIKE A CLASS ROOM OR A MOVIE THEATER: WOULD NEVER DOZE
HOW LIKELY ARE YOU TO NOD OFF OR FALL ASLEEP WHILE LYING DOWN TO REST IN THE AFTERNOON WHEN CIRCUMSTANCES PERMIT: HIGH CHANCE OF DOZING
HOW LIKELY ARE YOU TO NOD OFF OR FALL ASLEEP WHILE SITTING AND READING: SLIGHT CHANCE OF DOZING
SLEEP_PROBLEM_NOTICEABLE_TO_OTHERS: A LITTLE
HOW LIKELY ARE YOU TO NOD OFF OR FALL ASLEEP IN A CAR, WHILE STOPPED FOR A FEW MINUTES IN TRAFFIC: WOULD NEVER DOZE
WORRIED_DISTRESSED_DUE_TO_SLEEP: A LITTLE
ESS TOTAL SCORE: 9
HOW LIKELY ARE YOU TO NOD OFF OR FALL ASLEEP IN A CAR, WHILE STOPPED FOR A FEW MINUTES IN TRAFFIC: WOULD NEVER DOZE
HOW LIKELY ARE YOU TO NOD OFF OR FALL ASLEEP WHEN YOU ARE A PASSENGER IN A CAR FOR AN HOUR WITHOUT A BREAK: HIGH CHANCE OF DOZING
HOW LIKELY ARE YOU TO NOD OFF OR FALL ASLEEP WHILE SITTING AND TALKING TO SOMEONE: WOULD NEVER DOZE
ESS-CHAD TOTAL SCORE: 9
HOW LIKELY ARE YOU TO NOD OFF OR FALL ASLEEP WHILE SITTING QUIETLY AFTER LUNCH WITHOUT ALCOHOL: SLIGHT CHANCE OF DOZING
SLEEP_PROBLEM_INTERFERES_DAILY_ACTIVITIES: NOT AT ALL NOTICEABLE
SATISFACTION_WITH_CURRENT_SLEEP_PATTERN: VERY SATISFIED
HOW LIKELY ARE YOU TO NOD OFF OR FALL ASLEEP WHILE SITTING INACTIVE IN A PUBLIC PLACE: WOULD NEVER DOZE

## 2025-02-12 ASSESSMENT — COLUMBIA-SUICIDE SEVERITY RATING SCALE - C-SSRS
1. IN THE PAST MONTH, HAVE YOU WISHED YOU WERE DEAD OR WISHED YOU COULD GO TO SLEEP AND NOT WAKE UP?: NO
2. HAVE YOU ACTUALLY HAD ANY THOUGHTS OF KILLING YOURSELF?: NO
6. HAVE YOU EVER DONE ANYTHING, STARTED TO DO ANYTHING, OR PREPARED TO DO ANYTHING TO END YOUR LIFE?: NO

## 2025-02-12 NOTE — PROGRESS NOTES
Patient: Yajaira Obrien    [id number]  : 1975 -- AGE 49 y.o.    Provider: Jose Wong MD     Location [facility]   Service Date: 2025              [facility]  New Visit Note    ASSESSMENT/PLAN     Ms. Obrien is a 49 y.o. female and She was referred to the [facility] for evaluation of problems listed below on 2025      Problem List, Orders, Assessment, Recommendations:  Problem List Items Addressed This Visit             ICD-10-CM    Morbid obesity (Multi) E66.01    FRANCISCO (obstructive sleep apnea) - Primary G47.33     - HSAT showed Moderate FRANCISCO with AHI ~ 15  - will start APAP  5-12  cwp via [facility]  - lengthy discussion on FRANCISCO and PAP therapy education as well as the tips to be successful with PAP treatment  - patient voiced understanding  - patient will follow-up in 1-3 months and bring equipment to the follow-up clinic           Relevant Orders    Positive Airway Pressure (PAP) Therapy    BMI 45.0-49.9, adult (Multi) Z68.42     BMI Readings from Last 1 Encounters:   25 48.87 kg/m²     - encouraged healthy weight loss via diet and exercise  - continue to follow-up with bariatric team           Insomnia G47.00     Primarily maintenance issue which could be 2/2 untreated FRANCISCO          Disposition  Return to clinic in 2 months       HISTORY OF PRESENT ILLNESS     The patient's referring provider is: Maykel Duenas MD; [facility]    HISTORY OF PRESENT ILLNESS   Yajaira Obrien is a 49 y.o. female with h/o FRANCISCO and Obesity who presents to a [facility] for a sleep medicine evaluation with concerns of Procedure (Clearance), Referral, and Review Sleep Study Results.     Past Sleep History  Patient has the following sleep-related diagnoses: obstructive sleep apnea. Prior sleep study results: significant for FRANCISCO (Mod per AASM and mild per CMS) with AHI3% around 15, and AHI4% around  10.    Current History    On today's visit, the patient reports classic OSAS symptoms though doesn't think that she has difficulty keeping herself awake if she wants to.  She also has no problem just falling asleep either.  She got her HSAT as part of her bariatric pre-op eval process.  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[employer])    Sleep schedule:      Weekdays / Work Days Weekends / Days Off   Bedtime 10 pm  10:30 PM   Sleep latency 30 min same as weekdays   Wake time 6 am  8 AM   Total sleep time  Average/day:  Total sleep time 7-8 hours/day Same as weekdays   Naps sometimes   Nocturnal awakenings Yes, about 1-2 times a night     Preferred sleeping position: SLEEP POSITION: supine, prone, and sidelying    Sleep-related ROS:    Sleep Initiation: no problems going to sleep  Problems going to sleep associated with: No problems going to sleep    Sleep Maintenance: wakes up about 1-2 times per night and easily returns to sleep after awakening  Problems staying asleep associated with: Problems Staying Asleep: nocturia    Breathing during sleep: snoring, snorting during sleep, and witnessed apneas    RLS screen:  RLSSCREEN: - Sensations: Patient does not have unusual sensations in their extremities that cause an urge to move them     Daytime Symptoms  On awakening patient reports: morning dry mouth and morning sore throat    Patient reports DAYTIME SYMPTOMS: no daytime symptoms  Patient denies daytime symptoms including: Denies: feeling sleepy when driving  Fatigue: denies feeling fatigue    ESS: 9  BARTOLO: 2  FOSQ: 40      REVIEW OF SYSTEMS     REVIEW OF SYSTEMS  Review of Systems   Constitutional: Negative.    HENT: Negative.     Respiratory: Negative.     Cardiovascular: Negative.    Genitourinary: Negative.    Skin: Negative.    Neurological: Negative.    Psychiatric/Behavioral: Negative.       SLEEP ROS: snoring, snorting, periods of not breathing      ALLERGIES AND  "MEDICATIONS     ALLERGIES  Allergies   Allergen Reactions    Azithromycin Unknown    Ciprofloxacin Unknown    Sulfa (Sulfonamide Antibiotics) Unknown       MEDICATIONS  Current Outpatient Medications   Medication Sig Dispense Refill    atorvastatin (Lipitor) 10 mg tablet TAKE 1 TABLET (10mg) BY MOUTH ONCE DAILY 90 tablet 2    cetirizine (ZyrTEC) 10 mg tablet Take 1 tablet (10 mg) by mouth if needed for allergies or rhinitis. 90 tablet 3    fluticasone (Flonase) 50 mcg/actuation nasal spray Administer 1 spray into each nostril once daily. Shake gently. Before first use, prime pump. After use, clean tip and replace cap. 16 g 11    multivitamin tablet Take 1 tablet by mouth once daily. Gummy multivitamin      UNABLE TO FIND OTC probiotic       No current facility-administered medications for this visit.         PAST HISTORY     PAST MEDICAL HISTORY  She  has a past medical history of Allergies and Hyperlipidemia.    PAST SURGICAL HISTORY:  Past Surgical History:   Procedure Laterality Date    HYSTERECTOMY         FAMILY HISTORY  No family history on file.    She does have a family history of sleep disorder.    SOCIAL HISTORY  She  reports that she has never smoked. She has never used smokeless tobacco. She reports current alcohol use. She reports that she does not currently use drugs.      Caffeine consumption: No  Alcohol consumption: Yes, rarely (occasional)  Smoking: No  Marijuana: No      PHYSICAL EXAM     VITAL SIGNS: /83   Pulse 78   Resp 18   Ht 1.575 m (5' 2\")   Wt 121 kg (267 lb 3.2 oz)   SpO2 100%   BMI 48.87 kg/m²      CURRENT WEIGHT:   Vitals:    02/12/25 0812   Weight: 121 kg (267 lb 3.2 oz)     Body mass index is 48.87 kg/m².  PREVIOUS WEIGHTS:  Wt Readings from Last 3 Encounters:   02/12/25 121 kg (267 lb 3.2 oz)   01/24/25 126 kg (276 lb 14.4 oz)   01/17/25 125 kg (275 lb 12.8 oz)       Physical Exam  Vitals reviewed.   Constitutional:       General: She is not in acute distress.     " Appearance: Normal appearance. She is well-developed and normal weight.   HENT:      Head: Normocephalic and atraumatic.      Nose: Nose normal. No congestion or rhinorrhea.      Mouth/Throat:      Mouth: Mucous membranes are moist.      Pharynx: Oropharynx is clear. No oropharyngeal exudate.   Eyes:      General: No scleral icterus.     Extraocular Movements: Extraocular movements intact.      Conjunctiva/sclera: Conjunctivae normal.      Pupils: Pupils are equal, round, and reactive to light.   Neck:      Thyroid: No thyroid mass or thyroid tenderness.      Vascular: No JVD.   Cardiovascular:      Rate and Rhythm: Normal rate.      Pulses: Normal pulses.   Pulmonary:      Effort: Pulmonary effort is normal. No respiratory distress.   Musculoskeletal:      Cervical back: Normal range of motion and neck supple. No rigidity or tenderness.   Lymphadenopathy:      Cervical: No cervical adenopathy.   Neurological:      Mental Status: She is alert and oriented to person, place, and time.   Psychiatric:         Mood and Affect: Mood normal.         Behavior: Behavior normal.         Thought Content: Thought content normal.       PHYSICAL EXAM: MODIFIED MALLAMPATI SCORE: III (soft and hard palate and base of uvula visible)  TONGUE SCALLOPING: with scalloping      RESULTS/DATA     Bicarbonate (mmol/L)   Date Value   01/21/2025 29   06/06/2024 28   06/01/2023 26   01/14/2022 30     Iron (ug/dL)   Date Value   01/21/2025 119     % Saturation (%)   Date Value   01/21/2025 39     TIBC (ug/dL)   Date Value   01/21/2025 305     Ferritin (ng/mL)   Date Value   01/21/2025 217 (H)             PAP Adherence  Not applicable

## 2025-02-12 NOTE — ASSESSMENT & PLAN NOTE
- HSAT showed Moderate FRANCISCO with AHI ~ 15  - will start APAP  5-12  cwp via [facility]  - lengthy discussion on FRANCISCO and PAP therapy education as well as the tips to be successful with PAP treatment  - patient voiced understanding  - patient will follow-up in 1-3 months and bring equipment to the follow-up clinic

## 2025-02-12 NOTE — ASSESSMENT & PLAN NOTE
BMI Readings from Last 1 Encounters:   02/12/25 48.87 kg/m²     - encouraged healthy weight loss via diet and exercise  - continue to follow-up with bariatric team

## 2025-02-13 ENCOUNTER — TELEPHONE (OUTPATIENT)
Dept: SURGERY | Facility: CLINIC | Age: 50
End: 2025-02-13
Payer: COMMERCIAL

## 2025-02-13 DIAGNOSIS — A04.8 H. PYLORI INFECTION: ICD-10-CM

## 2025-02-13 RX ORDER — TETRACYCLINE HYDROCHLORIDE 500 MG/1
500 CAPSULE ORAL 4 TIMES DAILY
Qty: 56 CAPSULE | Refills: 0 | Status: SHIPPED | OUTPATIENT
Start: 2025-02-13 | End: 2025-02-27

## 2025-02-13 RX ORDER — METRONIDAZOLE 500 MG/1
500 TABLET ORAL 3 TIMES DAILY
Qty: 42 TABLET | Refills: 0 | Status: SHIPPED | OUTPATIENT
Start: 2025-02-13 | End: 2025-02-27

## 2025-02-13 RX ORDER — BISMUTH SUBSALICYLATE 525 MG/15ML
15 SUSPENSION ORAL 4 TIMES DAILY
Qty: 840 ML | Refills: 0 | Status: SHIPPED | OUTPATIENT
Start: 2025-02-13 | End: 2025-02-27

## 2025-02-13 RX ORDER — OMEPRAZOLE 40 MG/1
40 CAPSULE, DELAYED RELEASE ORAL
Qty: 28 CAPSULE | Refills: 0 | Status: SHIPPED | OUTPATIENT
Start: 2025-02-13 | End: 2025-02-27

## 2025-02-15 ENCOUNTER — NUTRITION (OUTPATIENT)
Dept: SURGERY | Facility: CLINIC | Age: 50
End: 2025-02-15
Payer: COMMERCIAL

## 2025-02-15 VITALS — BODY MASS INDEX: 48.76 KG/M2 | WEIGHT: 265 LBS | HEIGHT: 62 IN

## 2025-02-15 NOTE — PROGRESS NOTES
S:  Pt has been eating more protein and veggies.  She subs breakfast with a protein smoothie.  Pt is measuring out portions.    Pt has stopped eating nuts.  Pt has been practicing the 30-30-30 rule.  She trying to be mindful not drink before and after.      Diet  recall:   B: smoothies (spinach, fruit, protein powder and water) or has turkey robert or HB eggs and toast   S:   not usually or celery sticks w/dressing   L:   salad and  3 oz chicken or salmon  S:  not usually or very little cheese and few crackers  D:   3 oz chicken or salmon and veggies  S:  none  Beverages:   32-64 oz water    O:    Wt:  265.0     Ht: 62.0 in              BMI: 48.5    Goal: 5% body weight loss over the course of program    Dietary recommendation:   1. Continue to practice the 30-30-30 rule by drinking between meals.  2 Continue to have 2-3 meals and 1-2  high protein snacks daily.  3 Continue to have balanced meals that always contain a good source of protein.  4.  Increase physical activity by 10-15 minutes to an end goal of 60 minutes 5 x per week.    A/P:   Pt is doing well making changes in her eating habits.  She has lost 10 pounds since her initial assessment last month.  From a nutritional standpoint, she is cleared for surgery.  She will continue to follow up with RD for accountability.     Exercise:  rides bike for 20 min in AM before work 3x/week     Fatuma Shaw RD, LD

## 2025-03-04 NOTE — H&P
History Of Present Illness  Yajaira Obrien is a 49 y.o. female presenting with morbid obesity with a BMI of 51.69.  Patient is undergoing consultation and obtaining clearances for bariatric surgery.  She presents today for a prebariatric EGD.     Past Medical History  Past Medical History:   Diagnosis Date    Allergies     Hyperlipidemia        Surgical History  Past Surgical History:   Procedure Laterality Date    HYSTERECTOMY          Social History  She reports that she has never smoked. She has never used smokeless tobacco. She reports current alcohol use. She reports that she does not currently use drugs.    Family History  No family history on file.     Allergies  Azithromycin, Ciprofloxacin, and Sulfa (sulfonamide antibiotics)    Review of Systems  Constitutional: Negative  Gastrointestinal: Negative  Respiratory: Negative  Cardiovascular: Negative  Skin: Negative  Musculoskeletal: Negative  Neurological: Negative  Endocrine: Negative  Behavioral: Negative    Physical Exam  Patient is in no acute distress  No respiratory distress  Chest is clear to auscultation  Abdomen is soft, not tender, not distended  No guarding   Patient is alert and oriented x 3    Last Recorded Vitals  There were no vitals taken for this visit.    Assessment/Plan   49F Patient with morbid obesity presenting for pre-operative EGD in preparation for bariatric surgery    Plan:  - EGD + biopsy    I spent 30 minutes in the professional and overall care of this patient.      Tacos Alford MD

## 2025-03-05 ENCOUNTER — HOSPITAL ENCOUNTER (OUTPATIENT)
Dept: GASTROENTEROLOGY | Facility: HOSPITAL | Age: 50
Discharge: HOME | End: 2025-03-05
Payer: COMMERCIAL

## 2025-03-05 ENCOUNTER — ANESTHESIA (OUTPATIENT)
Dept: GASTROENTEROLOGY | Facility: HOSPITAL | Age: 50
End: 2025-03-05
Payer: COMMERCIAL

## 2025-03-05 ENCOUNTER — ANESTHESIA EVENT (OUTPATIENT)
Dept: GASTROENTEROLOGY | Facility: HOSPITAL | Age: 50
End: 2025-03-05
Payer: COMMERCIAL

## 2025-03-05 VITALS
TEMPERATURE: 96.8 F | HEIGHT: 62 IN | DIASTOLIC BLOOD PRESSURE: 102 MMHG | BODY MASS INDEX: 48.76 KG/M2 | HEART RATE: 82 BPM | SYSTOLIC BLOOD PRESSURE: 159 MMHG | OXYGEN SATURATION: 98 % | WEIGHT: 265 LBS | RESPIRATION RATE: 16 BRPM

## 2025-03-05 DIAGNOSIS — E66.01 MORBID OBESITY (MULTI): ICD-10-CM

## 2025-03-05 PROCEDURE — A43239 PR EDG TRANSORAL BIOPSY SINGLE/MULTIPLE: Performed by: NURSE ANESTHETIST, CERTIFIED REGISTERED

## 2025-03-05 PROCEDURE — 7100000009 HC PHASE TWO TIME - INITIAL BASE CHARGE: Performed by: SURGERY

## 2025-03-05 PROCEDURE — 3700000002 HC GENERAL ANESTHESIA TIME - EACH INCREMENTAL 1 MINUTE: Performed by: SURGERY

## 2025-03-05 PROCEDURE — 2500000004 HC RX 250 GENERAL PHARMACY W/ HCPCS (ALT 636 FOR OP/ED): Performed by: NURSE ANESTHETIST, CERTIFIED REGISTERED

## 2025-03-05 PROCEDURE — 43239 EGD BIOPSY SINGLE/MULTIPLE: CPT | Performed by: SURGERY

## 2025-03-05 PROCEDURE — 7100000010 HC PHASE TWO TIME - EACH INCREMENTAL 1 MINUTE: Performed by: SURGERY

## 2025-03-05 PROCEDURE — 3700000001 HC GENERAL ANESTHESIA TIME - INITIAL BASE CHARGE: Performed by: SURGERY

## 2025-03-05 PROCEDURE — A43239 PR EDG TRANSORAL BIOPSY SINGLE/MULTIPLE: Performed by: ANESTHESIOLOGY

## 2025-03-05 PROCEDURE — 99222 1ST HOSP IP/OBS MODERATE 55: CPT | Performed by: STUDENT IN AN ORGANIZED HEALTH CARE EDUCATION/TRAINING PROGRAM

## 2025-03-05 RX ORDER — LIDOCAINE HCL/PF 100 MG/5ML
SYRINGE (ML) INTRAVENOUS AS NEEDED
Status: DISCONTINUED | OUTPATIENT
Start: 2025-03-05 | End: 2025-03-05

## 2025-03-05 RX ORDER — PROPOFOL 10 MG/ML
INJECTION, EMULSION INTRAVENOUS AS NEEDED
Status: DISCONTINUED | OUTPATIENT
Start: 2025-03-05 | End: 2025-03-05

## 2025-03-05 RX ADMIN — PROPOFOL 100 MG: 10 INJECTION, EMULSION INTRAVENOUS at 07:39

## 2025-03-05 RX ADMIN — PROPOFOL 120 MCG/KG/MIN: 10 INJECTION, EMULSION INTRAVENOUS at 07:40

## 2025-03-05 RX ADMIN — LIDOCAINE HYDROCHLORIDE 80 MG: 20 INJECTION, SOLUTION INTRAVENOUS at 07:39

## 2025-03-05 SDOH — HEALTH STABILITY: MENTAL HEALTH: CURRENT SMOKER: 0

## 2025-03-05 ASSESSMENT — PAIN - FUNCTIONAL ASSESSMENT
PAIN_FUNCTIONAL_ASSESSMENT: 0-10

## 2025-03-05 ASSESSMENT — COLUMBIA-SUICIDE SEVERITY RATING SCALE - C-SSRS
2. HAVE YOU ACTUALLY HAD ANY THOUGHTS OF KILLING YOURSELF?: NO
6. HAVE YOU EVER DONE ANYTHING, STARTED TO DO ANYTHING, OR PREPARED TO DO ANYTHING TO END YOUR LIFE?: NO
1. IN THE PAST MONTH, HAVE YOU WISHED YOU WERE DEAD OR WISHED YOU COULD GO TO SLEEP AND NOT WAKE UP?: NO

## 2025-03-05 ASSESSMENT — PAIN SCALES - GENERAL
PAINLEVEL_OUTOF10: 0 - NO PAIN
PAIN_LEVEL: 0
PAINLEVEL_OUTOF10: 0 - NO PAIN

## 2025-03-05 NOTE — ANESTHESIA POSTPROCEDURE EVALUATION
Patient: Yajaira Obrien    Procedure Summary       Date: 03/05/25 Room / Location: [facility]    Anesthesia Start: 0736 Anesthesia Stop:     Procedure: EGD Diagnosis: Morbid obesity (Multi)    Scheduled Providers: Ravindra Smith MD; Matthieu Escobar MD Responsible Provider: Matthieu Escobar MD    Anesthesia Type: MAC ASA Status: 3            Anesthesia Type: MAC    Vitals Value Taken Time   /86 03/05/25 0753   Temp 36 °C (96.8 °F) 03/05/25 0752   Pulse 83 03/05/25 0752   Resp 16 03/05/25 0752   SpO2 100 % 03/05/25 0752   Vitals shown include unfiled device data.    Anesthesia Post Evaluation    Patient location during evaluation: PACU  Patient participation: complete - patient participated  Level of consciousness: sleepy but conscious  Pain score: 0  Pain management: adequate  Airway patency: patent  Cardiovascular status: acceptable, blood pressure returned to baseline and hemodynamically stable  Respiratory status: acceptable and face mask  Hydration status: acceptable  Postoperative Nausea and Vomiting: none        There were no known notable events for this encounter.     You were evaluated in the emergency department for vaginal discharge. Your examination was reassuring as was your work-up including urinalysis and pelvic swabs - you have a vaginal yeast infection which was treated in the ED with Diflucan. It will be important for you to follow-up with your primary care physician in 3-5 days if your symptoms do not improve. If you develop worsening symptoms such as abdominal pain or fevers, please return to the emergency department immediately.

## 2025-03-05 NOTE — ANESTHESIA PREPROCEDURE EVALUATION
Patient: Yajaira Obrien    Procedure Information       Date/Time: 03/05/25 0730    Scheduled providers: Ravindra Smith MD; Matthieu Escobar MD    Procedure: EGD    Location: [facility]            Relevant Problems   Anesthesia   (-) Difficult intubation   (-) PONV (postoperative nausea and vomiting)      Cardiac   (+) Hyperlipidemia      Pulmonary   (+) Mild intermittent asthma   (+) FRANCISCO (obstructive sleep apnea)      Neuro   (+) Lumbar radiculopathy, chronic      Endocrine   (+) Morbid obesity (Multi)      Musculoskeletal   (+) Chronic low back pain   (+) Lumbar spondylosis      ID   (+) Herpes genitalia      GYN   (+) Abnormal uterine bleeding (AUB)       Clinical information reviewed:   Tobacco  Allergies  Meds   Med Hx  Surg Hx   Fam Hx  Soc Hx        NPO Detail:  NPO/Void Status  Date of Last Liquid: 03/05/25  Time of Last Liquid: 0000  Date of Last Solid: 03/05/25  Time of Last Solid: 0000         Physical Exam    Airway  Mallampati: II  TM distance: >3 FB  Neck ROM: full     Cardiovascular - normal exam  Rhythm: regular  Rate: normal     Dental    Pulmonary - normal exam     Abdominal            Anesthesia Plan    History of general anesthesia?: yes  History of complications of general anesthesia?: no    ASA 3     MAC     The patient is not a current smoker.  Education provided regarding risk of obstructive sleep apnea.  intravenous induction   Anesthetic plan and risks discussed with patient.    Plan discussed with CRNA, CAA and attending.

## 2025-03-12 ENCOUNTER — DOCUMENTATION (OUTPATIENT)
Dept: SURGERY | Facility: CLINIC | Age: 50
End: 2025-03-12
Payer: COMMERCIAL

## 2025-03-15 ENCOUNTER — NUTRITION (OUTPATIENT)
Dept: SURGERY | Facility: CLINIC | Age: 50
End: 2025-03-15
Payer: COMMERCIAL

## 2025-03-15 VITALS — WEIGHT: 263 LBS | BODY MASS INDEX: 49.65 KG/M2 | HEIGHT: 61 IN

## 2025-03-15 NOTE — PROGRESS NOTES
S:    Pt is eating more protein and veggies.  She has added in 1 or 2 snacks if needed; nuts, celery or cheese.    She is doing better following the 30-30-30 rule.  She takes 20 min to complete a meal.     Diet  recall:   B: smoothie with protein powder  S: celery  L:  tuna and crackers  S:   none  D:  3 meatballs, 1 c  corn and  1 c potatoes  S:  none  Beverages:  64 oz water      O:    Wt:   263.0      Ht: 61.2 in         BMI: 49.4    Goal: 5% body weight loss over the course of program    Dietary recommendation:   1. Continue to practice the 30-30-30 rule by drinking between meals.  2 Continue to have 2-3 meals and 1-2  high protein snacks daily.  3. Measure out your starches to 1/2 cup servings, no more than 2 servings per meal.   4 Continue to have balanced meals that always contain a good source of protein.  5  Increase physical activity by 10-15 minutes to an end goal of 60 minutes 5 x per week.     A/P:   Pt continues to do well and lose weight. Recommend measuring out starch portions.  Otherwise she is doing well. She will continue to follow up for accountability.     Exercise:  rides bike for 20 min in AM before work 3x/week     Fatuma Shaw RD, AILEEN

## 2025-03-17 LAB
LAB AP ASR DISCLAIMER: NORMAL
LABORATORY COMMENT REPORT: NORMAL
PATH REPORT.FINAL DX SPEC: NORMAL
PATH REPORT.GROSS SPEC: NORMAL
PATH REPORT.RELEVANT HX SPEC: NORMAL
PATH REPORT.TOTAL CANCER: NORMAL

## 2025-03-19 ENCOUNTER — APPOINTMENT (OUTPATIENT)
Dept: BEHAVIORAL HEALTH | Facility: CLINIC | Age: 50
End: 2025-03-19
Payer: COMMERCIAL

## 2025-03-19 DIAGNOSIS — E66.01 PSYCHOLOGICAL FACTORS AFFECTING MORBID OBESITY (MULTI): ICD-10-CM

## 2025-03-19 DIAGNOSIS — F54 PSYCHOLOGICAL FACTORS AFFECTING MORBID OBESITY (MULTI): ICD-10-CM

## 2025-03-19 PROCEDURE — 90791 PSYCH DIAGNOSTIC EVALUATION: CPT | Performed by: PSYCHOLOGIST

## 2025-03-23 NOTE — PROGRESS NOTES
Verbal consent was requested and obtained from patient on this date for a telehealth visit.  The telehealth session connected with the patient at her home.    Non-secure Note: The patient has consented to an nonrestricted note.    I had the pleasure of seeing Yajaira Obrien at your request for behavioral evaluation for appropriateness for bariatric surgery.  As you know, she is a 49year old woman who reports a current weight of 263 pounds.     Weight History  She reports that she maintained a healthy weight through her teenage years but that weight began to be a problem for her after her son was born.  She initially lost weight after her pregnancy but then regained and continued to gain subsequently.  She has tried a variety of strategies to address her weight, particularly including [employer] programs, medical weight loss and self-managed dieting.  She now has been working with our nutritionist, Fatuma, and has done well so far, losing 13 pounds by emphasizing portion control, increasing protein intake, decreasing carbohydrate intake, hydrating frequently and exercising using a treadmill, cycling and yoga.  She reports that she is not an emotional eater nor does she have any history of an eating disorder.    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  She reports that her  and family are supportive of her pursuit of bariatric surgery.  She has 2 people she is close to in her life, a friend and the niece, who have had bariatric surgery, although she admits that she has not closely followed there behavior changes that have accompanied success.      Psychological Status  She reports that her mood is stable and euthymic with adequate energy and motivation.  She denies anhedonia.  She reports no history of irritability or mood fragility.  She has no history of substance misuse.  There is no family history of psychopathology or addiction.  She has no history of  serious mental illness nor history of psychiatric treatment.  She has no history of anxiety, fears, phobias or ruminative anxiety.    Sleep tends to be restorative for her.  Nonetheless, she was diagnosed with mild sleep apnea and is just beginning CPAP support.  She has no complaints of problems with concentration or memory and a brief cognitive screen reveals no gross deficits.  Reason and judgment seem fully intact.    Behavioral issues relevant for candidacy for bariatric surgery include:    1) Motivation: She is motivated by a desire to improve her health and to reduce the contribution of weight on persistent low back pain.    2) History of compliance: She reports no history of problems with compliance with medication regimens.    3) History of attempts to lose weight: She has tried and failed at more conservative approaches to weight loss.     4) Coping resources and social support: She feels able to cope with her mood issues actively and reports significant support from both family and friends in her pursuit of bariatric surgery.    5) Ability to maintain behavior post-surgery:  In my opinion, she is well-prepared to handle the behavioral demands that are consequent to bariatric surgery.    6) Psychological contraindications to surgery:  A comprehensive review of psychological symptoms reveals no contraindications to surgery.    Impressions    She is able to provide informed consent and is an appropriate candidate for bariatric surgery from the psychological perspective.      Behavioral confirmation of her candidacy will come in the form of her ability to adhere to her current dietary plan. Should she fare poorly with this adherence trial, please consider recommending a follow-up visit with this office.     Additionally, we had an extended discussion about behavioral responses to surgery for which she should be vigilant.  We discussed the post-surgical risks of mood deterioration, substance misuse, the  development of compulsive behaviors and the development of maladaptive coping responses.  She expressed understanding of these risks and agreed to contact our office with appropriate haste if any of these maladaptive responses were to develop after surgery.       Thank you for allowing me to collaborate in the evaluation of your patient. Please feel free to contact me if I can provide any additional information.    Ahmet Tanner, Ph.D.  Director, Division of Psychology  [facility]  Professor, Department of Psychiatry  [employer]  (o) [phone]

## 2025-03-29 ENCOUNTER — TELEMEDICINE CLINICAL SUPPORT (OUTPATIENT)
Dept: SURGERY | Facility: CLINIC | Age: 50
End: 2025-03-29
Payer: COMMERCIAL

## 2025-04-12 ENCOUNTER — NUTRITION (OUTPATIENT)
Dept: SURGERY | Facility: CLINIC | Age: 50
End: 2025-04-12
Payer: COMMERCIAL

## 2025-04-12 VITALS — WEIGHT: 264 LBS | HEIGHT: 61 IN | BODY MASS INDEX: 49.84 KG/M2

## 2025-04-12 NOTE — PROGRESS NOTES
S:  Pt eats 3 meals per day that contain protein.  She is following the 30-30-30 rule.  She eats slowly and chews well taking about 20 min complete a meal.      Thursday recall:   B:  2 egg sandwich on wheat bread w/cheese  S:  celery sticks  L:  turkey sandwich   S:  none  D:   salad w/tomatoes, onion, lettuce, cheese, 1 egg and robert bits  S:  1 popcorn.    Beverages:   64 oz water     O:    Wt:   264.0     Ht:              BMI: 49.6     Goal: 5% body weight loss over the course of program    Dietary recommendation:   1. Start measuring and tracking calories daily and aim for 1500 daily.   2. Continue to practice the 30-30-30 rule by drinking between meals.  3. Continue to  have 3 meals and 1 snack daily.  4. Continue to have balanced meals that always contain a good source of protein.  5. Increase intake of non-starchy vegetables.  Have 5 servings fruits and vegetables daily.   6. Increase physical activity by 10-15 minutes to an end goal of 60 minutes 5 x per week.    A/P:   Pt is doing well maintaining her weight.  Recommend that she track calories for accountability and making sure she is eating enough protein.  Also highly recommend that she try to increase the intensity of her exercise to hep burn  more calories     Exercise:  rides for 40 min and walks on treadmill for 15 min 3-4x/week     Fatuma Shaw RD, LD

## 2025-04-18 ENCOUNTER — APPOINTMENT (OUTPATIENT)
Dept: SLEEP MEDICINE | Facility: CLINIC | Age: 50
End: 2025-04-18
Payer: COMMERCIAL

## 2025-04-18 VITALS
DIASTOLIC BLOOD PRESSURE: 89 MMHG | SYSTOLIC BLOOD PRESSURE: 141 MMHG | TEMPERATURE: 98.7 F | BODY MASS INDEX: 49.76 KG/M2 | WEIGHT: 270.4 LBS | HEART RATE: 75 BPM | HEIGHT: 62 IN

## 2025-04-18 DIAGNOSIS — G47.33 OSA (OBSTRUCTIVE SLEEP APNEA): Primary | ICD-10-CM

## 2025-04-18 PROCEDURE — 99214 OFFICE O/P EST MOD 30 MIN: CPT | Performed by: STUDENT IN AN ORGANIZED HEALTH CARE EDUCATION/TRAINING PROGRAM

## 2025-04-18 PROCEDURE — 1036F TOBACCO NON-USER: CPT | Performed by: STUDENT IN AN ORGANIZED HEALTH CARE EDUCATION/TRAINING PROGRAM

## 2025-04-18 PROCEDURE — 3008F BODY MASS INDEX DOCD: CPT | Performed by: STUDENT IN AN ORGANIZED HEALTH CARE EDUCATION/TRAINING PROGRAM

## 2025-04-18 ASSESSMENT — SLEEP AND FATIGUE QUESTIONNAIRES
HOW LIKELY ARE YOU TO NOD OFF OR FALL ASLEEP WHEN YOU ARE A PASSENGER IN A CAR FOR AN HOUR WITHOUT A BREAK: SLIGHT CHANCE OF DOZING
SLEEP_PROBLEM_NOTICEABLE_TO_OTHERS: NOT AT ALL NOTICEABLE
SITING INACTIVE IN A PUBLIC PLACE LIKE A CLASS ROOM OR A MOVIE THEATER: SLIGHT CHANCE OF DOZING
HOW LIKELY ARE YOU TO NOD OFF OR FALL ASLEEP WHILE WATCHING TV: SLIGHT CHANCE OF DOZING
HOW LIKELY ARE YOU TO NOD OFF OR FALL ASLEEP WHILE SITTING AND TALKING TO SOMEONE: WOULD NEVER DOZE
SLEEP_PROBLEM_INTERFERES_DAILY_ACTIVITIES: A LITTLE
HOW LIKELY ARE YOU TO NOD OFF OR FALL ASLEEP IN A CAR, WHILE STOPPED FOR A FEW MINUTES IN TRAFFIC: WOULD NEVER DOZE
WORRIED_DISTRESSED_DUE_TO_SLEEP: SOMEWHAT
HOW LIKELY ARE YOU TO NOD OFF OR FALL ASLEEP WHILE LYING DOWN TO REST IN THE AFTERNOON WHEN CIRCUMSTANCES PERMIT: HIGH CHANCE OF DOZING
SATISFACTION_WITH_CURRENT_SLEEP_PATTERN: SATISFIED
ESS-CHAD TOTAL SCORE: 9
HOW LIKELY ARE YOU TO NOD OFF OR FALL ASLEEP WHILE SITTING AND READING: MODERATE CHANCE OF DOZING
HOW LIKELY ARE YOU TO NOD OFF OR FALL ASLEEP WHILE SITTING QUIETLY AFTER LUNCH WITHOUT ALCOHOL: SLIGHT CHANCE OF DOZING
WAKING_TOO_EARLY: MILD

## 2025-04-18 NOTE — PROGRESS NOTES
Patient: Yajaira Obrien    [id number]  : 1975 -- AGE 49 y.o.    Provider: Jose Wong MD     Location [facility]   Service Date: 2025              [facility]  Followup Visit Note     ASSESSMENT AND PLAN   Ms. Obrien is a 49 y.o. female and she returns in followup to the [facility] for the problems listed below on 25     Problem List, Orders, Assessment, Recommendations:    Obstructive Sleep Apnea (FRANCISCO)  Symptoms improved. Nasal soreness from CPAP nasal pillows persists. New nasal mask option provided and found more comfortable.  - Provide new nasal mask ([employer] Solo) for trial.  - Instruct to bring CPAP machine and mask to surgery for postoperative use.  - Advise to inform anesthesiologist about CPAP use during preoperative checks.  - Recommend continued CPAP use until follow-up six months post-surgery.    Preoperative Evaluation/Obesity  We discussed the importance of PAP therapy in the perioperative period, as well as expectations for possible changes in therapy with weight loss.   Standard FRANCISCO precautions should be taken during surgery, as outlined in the Practice Guidelines for the Perioperative Management of Patients with Obstructive Sleep Apnea: An Updated Report by the American Society of Anesthesiologists Task Force on Perioperative Management of Patients with Obstructive Sleep Apnea.  ===============================================     PreOp Clearance:     The patient is at increased but not prohibitive risk due to FRANCISCO and Obesity.  They are cleared to proceed to surgery with the following recommendations:     - CPAP use preOp and postOp (as soon as able). Told to bring CPAP machine with them to surgery.  - minimize narcotics, avoid benzodiazepines  - aggressive bronchodilators pre/postOp  - aggressive pulmonary toilet (IS, early ambulation) pre/postOp  - call Pulmonary Consult if questions perioperatively  -  follow-up with me in 4-6 months after surgery      Follow-up  Advised to follow up six months post-surgery to reassess condition and CPAP use.  - Schedule follow-up appointment six months post-surgery.      Disposition  Return to clinic plan to be determined based on surgery date (6 months after surgery)        HISTORY OF PRESENT ILLNESS     HISTORY OF PRESENT ILLNESS   Yajaira Obrien is a 49 y.o. female with history of Obesity and FRANCISCO presents to [facility] for followup.     Assessment and plan from last visit:   Ms. Obrien is a 49 y.o. female and She was referred to the [facility] for evaluation of problems listed below on 2/12/2025       Problem List, Orders, Assessment, Recommendations:  Problem List Items Addressed This Visit               ICD-10-CM     Morbid obesity (Multi) E66.01     FRANCISCO (obstructive sleep apnea) - Primary G47.33       - HSAT showed Moderate FRANCISCO with AHI ~ 15  - will start APAP  5-12  cwp via [facility]  - lengthy discussion on FRANCISCO and PAP therapy education as well as the tips to be successful with PAP treatment  - patient voiced understanding  - patient will follow-up in 1-3 months and bring equipment to the follow-up clinic              Relevant Orders     Positive Airway Pressure (PAP) Therapy     BMI 45.0-49.9, adult (Multi) Z68.42           BMI Readings from Last 1 Encounters:   02/12/25 48.87 kg/m²      - encouraged healthy weight loss via diet and exercise  - continue to follow-up with bariatric team              Insomnia G47.00       Primarily maintenance issue which could be 2/2 untreated FRANCISCO            Disposition  Return to clinic in 2 months    Current History    History of Present Illness  She reports an overall improvement in her condition, no longer requiring daytime naps. Previously, she would wake up at 8 AM on her days off feeling the need to sleep more, but now she wakes up at 7 AM and feels she can get up without issue.  "    She has been using nasal CPAP therapy but experienced discomfort with the nasal pillows, particularly in one nostril, causing soreness. Initially, she used small-sized nasal pillows but found the medium size to fit better, although it still caused discomfort. She has been applying Vaseline to alleviate the soreness. She received small-sized nasal pillows that were supposed to be picked up and replaced with mediums, but the doug were never collected, and she was charged for them.    She is currently dealing with insurance issues related to an upcoming surgery, which requires her to travel to [place]. She is stressed about the $3,000 deductible that must be paid three weeks before the surgery date.     PAP Info  DME COMPANY: [employer]  Issues with PAP Therapy?: None  Perceived Benefits of PAP therapy: refreshing sleep    PAP Adherence  PAP Adherence : A PAP adherence download was obtained and data was reviewed personally today in clinic., Screenshot of PAP download is attached below        Daytime Symptoms  Patient reports: no daytime symptoms  Patient denies: excessive daytime sleepiness  Napping habit: Patient denies taking naps.  Fatigue concerns: Patient denies feeling fatigue    ESS: 9  BARTOLO: 5  FOSQ: 39    PHYSICAL EXAM     VITAL SIGNS: /89 (BP Location: Right arm, Patient Position: Sitting, BP Cuff Size: Adult)   Pulse 75   Temp 37.1 °C (98.7 °F) (Oral)   Ht 1.575 m (5' 2\")   Wt 123 kg (270 lb 6.4 oz)   BMI 49.46 kg/m²      PREVIOUS WEIGHTS:  Wt Readings from Last 3 Encounters:   04/18/25 123 kg (270 lb 6.4 oz)   04/12/25 120 kg (264 lb)   03/15/25 119 kg (263 lb)     "

## 2025-04-18 NOTE — PATIENT INSTRUCTIONS
We discussed the importance of PAP therapy in the perioperative period, as well as expectations for possible changes in therapy with weight loss.   Standard FRANCISCO precautions should be taken during surgery, as outlined in the Practice Guidelines for the Perioperative Management of Patients with Obstructive Sleep Apnea: An Updated Report by the American Society of Anesthesiologists Task Force on Perioperative Management of Patients with Obstructive Sleep Apnea.  ===============================================     PreOp Clearance:     The patient is at increased but not prohibitive risk due to FRANCISCO and Diabetes.  They are cleared to proceed to surgery with the following recommendations:     - CPAP use preOp and postOp (as soon as able). Told to bring CPAP machine with them to surgery.  - minimize narcotics, avoid benzodiazepines  - aggressive bronchodilators pre/postOp  - aggressive pulmonary toilet (IS, early ambulation) pre/postOp  - call Pulmonary Consult if questions perioperatively  - follow-up with me in 4-6 months after surgery

## 2025-06-25 ENCOUNTER — TELEPHONE (OUTPATIENT)
Dept: SURGERY | Facility: CLINIC | Age: 50
End: 2025-06-25
Payer: COMMERCIAL

## 2025-06-25 NOTE — TELEPHONE ENCOUNTER
Fax from facility in [place] requesting confirmation of post op Bariatric care, patient was previously covered under [facility] () insurance. When  transitioned to [facility], her insurance plan changed, and bariatric surgery became a plan exclusion.  Dr agreed to follow patient, I let the patient know that we can see her for follow ups, but she would be best served to speak with financial counseling to get a good mar estimate regarding the out of pocket cost she should expect for follow up visits so she does not get surprise bills.  Gave her FC # [phone], she is to see the surgeon 3 month post op,  Her surgery will not be scheduled until there is a plan for post op care.  Patient will call when ready to schedule.

## 2025-07-16 ENCOUNTER — APPOINTMENT (OUTPATIENT)
Dept: PRIMARY CARE | Facility: CLINIC | Age: 50
End: 2025-07-16
Payer: COMMERCIAL

## 2025-08-26 DIAGNOSIS — E78.2 MIXED HYPERLIPIDEMIA: ICD-10-CM

## 2025-08-27 RX ORDER — ATORVASTATIN CALCIUM 10 MG/1
10 TABLET, FILM COATED ORAL
Qty: 90 TABLET | Refills: 0 | Status: SHIPPED | OUTPATIENT
Start: 2025-08-27

## 2025-09-24 ENCOUNTER — APPOINTMENT (OUTPATIENT)
Dept: PRIMARY CARE | Facility: CLINIC | Age: 50
End: 2025-09-24
Payer: COMMERCIAL